# Patient Record
Sex: FEMALE | Race: BLACK OR AFRICAN AMERICAN | NOT HISPANIC OR LATINO
[De-identification: names, ages, dates, MRNs, and addresses within clinical notes are randomized per-mention and may not be internally consistent; named-entity substitution may affect disease eponyms.]

---

## 2018-11-23 ENCOUNTER — APPOINTMENT (OUTPATIENT)
Dept: DERMATOLOGY | Facility: CLINIC | Age: 48
End: 2018-11-23

## 2019-12-26 ENCOUNTER — RESULT REVIEW (OUTPATIENT)
Age: 49
End: 2019-12-26

## 2021-04-20 ENCOUNTER — APPOINTMENT (OUTPATIENT)
Dept: RHEUMATOLOGY | Facility: CLINIC | Age: 51
End: 2021-04-20

## 2021-04-20 VITALS
HEIGHT: 69 IN | OXYGEN SATURATION: 100 % | SYSTOLIC BLOOD PRESSURE: 122 MMHG | TEMPERATURE: 96.3 F | HEART RATE: 90 BPM | WEIGHT: 205.5 LBS | BODY MASS INDEX: 30.44 KG/M2 | RESPIRATION RATE: 16 BRPM | DIASTOLIC BLOOD PRESSURE: 92 MMHG

## 2021-04-20 NOTE — PHYSICAL EXAM
[Sclera] : the sclera and conjunctiva were normal [General Appearance - Alert] : alert [Outer Ear] : the ears and nose were normal in appearance [Examination Of The Oral Cavity] : the lips and gums were normal [Nasal Cavity] : the nasal mucosa and septum were normal [Neck Appearance] : the appearance of the neck was normal [] : no respiratory distress [Auscultation Breath Sounds / Voice Sounds] : lungs were clear to auscultation bilaterally [Exaggerated Use Of Accessory Muscles For Inspiration] : no accessory muscle use [Heart Rate And Rhythm] : heart rate was normal and rhythm regular [Heart Sounds] : normal S1 and S2 [Heart Sounds Gallop] : no gallops [Murmurs] : no murmurs [Heart Sounds Pericardial Friction Rub] : no pericardial rub [Bowel Sounds] : normal bowel sounds [Abdomen Soft] : soft [Abdomen Tenderness] : non-tender [Cervical Lymph Nodes Enlarged Posterior Bilaterally] : posterior cervical [Cervical Lymph Nodes Enlarged Anterior Bilaterally] : anterior cervical [Axillary Lymph Nodes Enlarged Bilaterally] : axillary [No CVA Tenderness] : no ~M costovertebral angle tenderness [No Spinal Tenderness] : no spinal tenderness [Musculoskeletal - Swelling] : no joint swelling seen [FreeTextEntry1] : see hpi [No Focal Deficits] : no focal deficits

## 2021-04-20 NOTE — HISTORY OF PRESENT ILLNESS
[FreeTextEntry1] : 51 year old female diagnosed with SLE more than 10 years ago by Dr Crystal Sagastume; most recently followed by Marsha Lutz.  She has had MIRANDA, DNA, leukopenia, malar rash, photosensitivity, alopecia (patchy)--given clobetasol shampoo in the past, a history of anorexia with a 20lb weight loss in 2017,  and arthritis affecting her knees, ankles, fingers which can be associated with swelling and stiffness for several hours.  She also notes fatigue despite 8-10 hours of sleep.  There is no history of renal, hepatic, serositis, miscarriages, thrombosis, seizures or psychosis.  She has not been hospitalized, has received no steroids or immunosuppressants.  She has been on plaquenil for over 10 years with opthalmology follow-up, but off since December 2020 secondary to insurance issues (she is currently unemployed).  She currently notes malar and other red rashes, alopecia and joint pains in her knees, ankles and fingers.  No fever,  anorexia, weight loss, lymphadenopathy, chills,oral ulcers, dry eyes, chest pain, shortness of breath, cough, anosmia, ageusia, nausea, vomiting, diarrhea, abdominal pain.  \par \par SH: no tob, no ETOH, no recreational drugs\par FH:  non-contributory, no autoimmune disease; 2 brothers, 1 sister, no children\par Surg: Lasik\par Hospitalization: none\par Allergy: PCN--rash\par \par PE: NAD  122/92 205lbs 8ox\par Mild malar erythema, frontal hair loss with patchy areas of decreased hair density on posterior scalp. No oral/nasal ulceration, lymphadenopathy, synovitis (or joint tenderness), periungal erythema, parotid swelling, lacrimal enlargement, nodules.  Lungs: Clear without wheeze, rales, rhonch; Cor: S1S2 without murmur, gallop or rub; Abd: soft, NT, BS+;, Back: no spinal or paraspinal tenderness; Ext: no CCE; Neuro: non-focal\par \par Imp:\par SLE with active cutaneous features and ?MS manifestations (none on current exam).  Will check labs.  Will represcribe plaquenil.  Will request medical records from Dr Michele

## 2021-04-26 LAB
CREAT SPEC-SCNC: 136 MG/DL
CREAT/PROT UR: 0.1 RATIO
PROT UR-MCNC: 9 MG/DL

## 2021-06-21 ENCOUNTER — APPOINTMENT (OUTPATIENT)
Dept: RHEUMATOLOGY | Facility: CLINIC | Age: 51
End: 2021-06-21

## 2021-06-21 VITALS
HEART RATE: 77 BPM | BODY MASS INDEX: 30.57 KG/M2 | DIASTOLIC BLOOD PRESSURE: 84 MMHG | SYSTOLIC BLOOD PRESSURE: 122 MMHG | WEIGHT: 207 LBS | OXYGEN SATURATION: 98 % | TEMPERATURE: 97.4 F | RESPIRATION RATE: 12 BRPM

## 2021-06-22 NOTE — PHYSICAL EXAM
[General Appearance - Alert] : alert [Sclera] : the sclera and conjunctiva were normal [Outer Ear] : the ears and nose were normal in appearance [Examination Of The Oral Cavity] : the lips and gums were normal [Nasal Cavity] : the nasal mucosa and septum were normal [Neck Appearance] : the appearance of the neck was normal [] : no respiratory distress [Exaggerated Use Of Accessory Muscles For Inspiration] : no accessory muscle use [Auscultation Breath Sounds / Voice Sounds] : lungs were clear to auscultation bilaterally [Heart Rate And Rhythm] : heart rate was normal and rhythm regular [Heart Sounds] : normal S1 and S2 [Heart Sounds Gallop] : no gallops [Murmurs] : no murmurs [Heart Sounds Pericardial Friction Rub] : no pericardial rub [Edema] : there was no peripheral edema [Bowel Sounds] : normal bowel sounds [Abdomen Soft] : soft [Abdomen Tenderness] : non-tender [Cervical Lymph Nodes Enlarged Posterior Bilaterally] : posterior cervical [Cervical Lymph Nodes Enlarged Anterior Bilaterally] : anterior cervical [Supraclavicular Lymph Nodes Enlarged Bilaterally] : supraclavicular [No CVA Tenderness] : no ~M costovertebral angle tenderness [No Spinal Tenderness] : no spinal tenderness [Musculoskeletal - Swelling] : no joint swelling seen [Skin Lesions] : no skin lesions [No Focal Deficits] : no focal deficits [FreeTextEntry1] : see hpi

## 2021-06-22 NOTE — HISTORY OF PRESENT ILLNESS
[FreeTextEntry1] : 51 year old female diagnosed with SLE more than 10 years ago by Dr Crystal Sagastume; most recently followed by Marsha Lutz.  She has had MIRADNA, DNA, leukopenia, malar rash, photosensitivity, alopecia (patchy)--given clobetasol shampoo in the past, a history of anorexia with a 20lb weight loss in 2017,  and arthritis affecting her knees, ankles, fingers which can be associated with swelling and stiffness for several hours.  She also notes fatigue despite 8-10 hours of sleep.  There is no history of renal, hepatic, serositis, miscarriages, thrombosis, seizures or psychosis.  She has not been hospitalized, has received no steroids or immunosuppressants.  She has been on plaquenil for over 10 years with opthalmology follow-up, but off since December 2020 secondary to insurance issues (she is currently unemployed).  \par Apr 20, 2021 notes malar and other red rashes, alopecia and joint pains in her knees, ankles and fingers.  No fever,  anorexia, weight loss, lymphadenopathy, chills,oral ulcers, dry eyes, chest pain, shortness of breath, cough, anosmia, ageusia, nausea, vomiting, diarrhea, abdominal pain.  \par \par June 21, 2021\par f/u SLE, she has been on plaquenil since med-May.  She continues to note fatigue (which is better) and alopecia; she has also had facial rashes (but none at the moment--and, the malar erythema seen last visit is not present today).  The joint pains in her fingers and ankles are improved; her knees continue to be painful and the right knee has buckled on several occasions.  She also is concerned that her nails are not growing.  She received the Pfizer vaccine and experienced a sore arm without other adverse effects.  There has been no fever, anorexia, weight loss, headaches, lymphadenopathy, chills, joral ulcers,  dry eyes, chest pain, shortness of breath, cough, anosmia, ageusia, nausea, vomiting, diarrhea, abdominal pain. Of note, records from her Dr [facility] were the same as those initially given by the patient. Her labs from last visit were significant for leukopenia (WBC 1.93), elevated DNA (262), but normal complements and normal renal function.\par \par SH: no tob, no ETOH, no recreational drugs\par FH:  non-contributory, no autoimmune disease; 2 brothers, 1 sister, no children\par Surg: Lasik\par Hospitalization: none\par Allergy: PCN--rash\par \par PE: NAD  122/84 207lbs 8ox\par No malar erythema, frontal hair loss and lupus frizz with patchy areas of decreased hair density on posterior scalp. No oral/nasal ulceration, lymphadenopathy, synovitis (or joint tenderness), periungal erythema, parotid swelling, lacrimal enlargement, nodules.  Lungs: Clear without wheeze, rales, rhonch; Cor: S1S2 without murmur, gallop or rub; Abd: soft, NT, BS+;, Back: no spinal or paraspinal tenderness; Ext: no CCE; Neuro: non-focal\par \par Imp:\par SLE recently restarted on plaquenil with improvement of fatigue and musculoskeletal manifestations.  Will check labs and will see in ~6 weeks.  \par \par Knee pain/buckling--Have discussed that these symptoms are not likely caused by lupus, but are more likely to be degenerative.  Have given exercises to strengthen the hip flexors--she currently is without insurance (it is pendin), but have discussed role of physical therapy.

## 2021-08-02 ENCOUNTER — APPOINTMENT (OUTPATIENT)
Dept: RHEUMATOLOGY | Facility: CLINIC | Age: 51
End: 2021-08-02

## 2021-08-02 VITALS
RESPIRATION RATE: 14 BRPM | OXYGEN SATURATION: 99 % | SYSTOLIC BLOOD PRESSURE: 125 MMHG | TEMPERATURE: 97.8 F | DIASTOLIC BLOOD PRESSURE: 86 MMHG | BODY MASS INDEX: 30.96 KG/M2 | WEIGHT: 209 LBS | HEIGHT: 69 IN | HEART RATE: 89 BPM

## 2021-08-02 RX ORDER — DESONIDE 0.5 MG/ML
0.05 LOTION TOPICAL DAILY
Qty: 1 | Refills: 2 | Status: ACTIVE | COMMUNITY
Start: 2021-08-02 | End: 1900-01-01

## 2021-08-02 NOTE — PHYSICAL EXAM
[General Appearance - Alert] : alert [Sclera] : the sclera and conjunctiva were normal [Outer Ear] : the ears and nose were normal in appearance [Examination Of The Oral Cavity] : the lips and gums were normal [Nasal Cavity] : the nasal mucosa and septum were normal [Neck Appearance] : the appearance of the neck was normal [] : no respiratory distress [Exaggerated Use Of Accessory Muscles For Inspiration] : no accessory muscle use [Auscultation Breath Sounds / Voice Sounds] : lungs were clear to auscultation bilaterally [Heart Rate And Rhythm] : heart rate was normal and rhythm regular [Heart Sounds] : normal S1 and S2 [Heart Sounds Gallop] : no gallops [Murmurs] : no murmurs [Heart Sounds Pericardial Friction Rub] : no pericardial rub [Edema] : there was no peripheral edema [Bowel Sounds] : normal bowel sounds [Abdomen Soft] : soft [Abdomen Tenderness] : non-tender [Cervical Lymph Nodes Enlarged Posterior Bilaterally] : posterior cervical [Supraclavicular Lymph Nodes Enlarged Bilaterally] : supraclavicular [Cervical Lymph Nodes Enlarged Anterior Bilaterally] : anterior cervical [No CVA Tenderness] : no ~M costovertebral angle tenderness [No Spinal Tenderness] : no spinal tenderness [Musculoskeletal - Swelling] : no joint swelling seen [Skin Lesions] : no skin lesions [No Focal Deficits] : no focal deficits [FreeTextEntry1] : see hpi

## 2021-08-02 NOTE — HISTORY OF PRESENT ILLNESS
[FreeTextEntry1] : 51 year old female diagnosed with SLE more than 10 years ago by Dr Crystal Sagastume; most recently followed by Marsha Lutz.  She has had MIRANDA, DNA, leukopenia, malar rash, photosensitivity, alopecia (patchy)--given clobetasol shampoo in the past, a history of anorexia with a 20lb weight loss in 2017,  and arthritis affecting her knees, ankles, fingers which can be associated with swelling and stiffness for several hours.  She also notes fatigue despite 8-10 hours of sleep.  There is no history of renal, hepatic, serositis, miscarriages, thrombosis, seizures or psychosis.  She has not been hospitalized, has received no steroids or immunosuppressants.  She has been on plaquenil for over 10 years with opthalmology follow-up, but off since December 2020 secondary to insurance issues (she is currently unemployed).  \par Apr 20, 2021 notes malar and other red rashes, alopecia and joint pains in her knees, ankles and fingers.  No fever,  anorexia, weight loss, lymphadenopathy, chills,oral ulcers, dry eyes, chest pain, shortness of breath, cough, anosmia, ageusia, nausea, vomiting, diarrhea, abdominal pain.  \par \par June 21, 2021\par f/u SLE, she has been on plaquenil since med-May.  She continues to note fatigue (which is better) and alopecia; she has also had facial rashes (but none at the moment--and, the malar erythema seen last visit is not present today).  The joint pains in her fingers and ankles are improved; her knees continue to be painful and the right knee has buckled on several occasions.  She also is concerned that her nails are not growing.  She received the Pfizer vaccine and experienced a sore arm without other adverse effects.  There has been no fever, anorexia, weight loss, headaches, lymphadenopathy, chills, joral ulcers,  dry eyes, chest pain, shortness of breath, cough, anosmia, ageusia, nausea, vomiting, diarrhea, abdominal pain. Of note, records from her Dr [facility] were the same as those initially given by the patient. Her labs from last visit were significant for leukopenia (WBC 1.93), elevated DNA (262), but normal complements and normal renal function.\par \par August 2, 2021 PtGA 6.0\par f/u SLE, continues with symptoms, but feels that she is ~40% improved--now on plaquenil since mid-May.  These include her joint pains (of her toes and fingers--without swelling) and fatigue.  She does have 30-60 minutes of am stiffness.  She continues to note alopecia and is considering follicle plug implants.  No fever, rash, anorexia, weight loss (she has gained 2 lbs), lymphadenopathy, chills, joint pain/swelling/stiffness, oral ulcers, fatigue, dry eyes, chest pain, shortness of breath, cough, anosmia, ageusia, nausea, vomiting, diarrhea, abdominal pain. She also notes pruritis of her scalp.\par \par SH: no tob, no ETOH, no recreational drugs\par FH:  non-contributory, no autoimmune disease; 2 brothers, 1 sister, no children\par Surg: Lasik\par Hospitalization: none\par Allergy: PCN--rash\par \par PE: NAD  122/84 207lbs 8ox\par No malar erythema, frontal hair loss and lupus frizz with patchy areas of decreased hair density on posterior scalp. No oral/nasal ulceration, lymphadenopathy, synovitis (or joint tenderness), periungal erythema, parotid swelling, lacrimal enlargement, nodules.  Lungs: Clear without wheeze, rales, rhonch; Cor: S1S2 without murmur, gallop or rub; Abd: soft, NT, BS+;, Back: no spinal or paraspinal tenderness; Ext: no CCE; Neuro: non-focal\par \par Imp:\par SLE on plaquenil x 21/2 months. with improvement of fatigue and musculoskeletal manifestations.  Will check labs and will see in ~6 weeks.  Have suggested that she see derm (?steroid scalp injections)--will give desonide for scalp.\par \par Knee pain/buckling--She continues with home exercises for strengthening of hip flexors--she continues without insurance (it is pending), will refer to PT when she obtains insurance.

## 2021-10-25 ENCOUNTER — APPOINTMENT (OUTPATIENT)
Dept: RHEUMATOLOGY | Facility: CLINIC | Age: 51
End: 2021-10-25

## 2022-02-28 ENCOUNTER — APPOINTMENT (OUTPATIENT)
Dept: RHEUMATOLOGY | Facility: CLINIC | Age: 52
End: 2022-02-28

## 2022-02-28 VITALS
HEART RATE: 74 BPM | TEMPERATURE: 97.8 F | OXYGEN SATURATION: 98 % | BODY MASS INDEX: 31.01 KG/M2 | WEIGHT: 210 LBS | DIASTOLIC BLOOD PRESSURE: 77 MMHG | SYSTOLIC BLOOD PRESSURE: 124 MMHG | RESPIRATION RATE: 14 BRPM

## 2022-02-28 DIAGNOSIS — M32.9 SYSTEMIC LUPUS ERYTHEMATOSUS, UNSPECIFIED: ICD-10-CM

## 2022-02-28 NOTE — HISTORY OF PRESENT ILLNESS
[FreeTextEntry1] : 52 year old female diagnosed with SLE prior to 2011 by Dr Crystal Sagastume; most recently followed by Marsha Lutz.  She has had MIRANDA, DNA, leukopenia, malar rash, photosensitivity, alopecia (patchy)--given clobetasol shampoo in the past, a history of anorexia with a 20lb weight loss in 2017,  and arthritis affecting her knees, ankles, fingers which can be associated with swelling and stiffness for several hours.  She also notes fatigue despite 8-10 hours of sleep.  There is no history of renal, hepatic, serositis, miscarriages, thrombosis, seizures or psychosis.  She has not been hospitalized, has received no steroids or immunosuppressants.  She has been on plaquenil for over 10 years with opthalmology follow-up, but off since December 2020 secondary to insurance issues (she is currently unemployed).  \par Apr 20, 2021 notes malar and other red rashes, alopecia and joint pains in her knees, ankles and fingers.  No fever,  anorexia, weight loss, lymphadenopathy, chills,oral ulcers, dry eyes, chest pain, shortness of breath, cough, anosmia, ageusia, nausea, vomiting, diarrhea, abdominal pain.  \par \par June 21, 2021\par f/u SLE, she has been on plaquenil since med-May.  She continues to note fatigue (which is better) and alopecia; she has also had facial rashes (but none at the moment--and, the malar erythema seen last visit is not present today).  The joint pains in her fingers and ankles are improved; her knees continue to be painful and the right knee has buckled on several occasions.  She also is concerned that her nails are not growing.  She received the Pfizer vaccine and experienced a sore arm without other adverse effects.  There has been no fever, anorexia, weight loss, headaches, lymphadenopathy, chills, joral ulcers,  dry eyes, chest pain, shortness of breath, cough, anosmia, ageusia, nausea, vomiting, diarrhea, abdominal pain. Of note, records from her Dr [facility] were the same as those initially given by the patient. Her labs from last visit were significant for leukopenia (WBC 1.93), elevated DNA (262), but normal complements and normal renal function.\par \par August 2, 2021 PtGA 6.0\par f/u SLE, continues with symptoms, but feels that she is ~40% improved--now on plaquenil since mid-May. These include her joint pains (of her toes and fingers--without swelling) and fatigue. She does have 30-60 minutes of am stiffness. She continues to note alopecia and is considering follicle plug implants. No fever, rash, anorexia, weight loss (she has gained 2 lbs), lymphadenopathy, chills, joint pain/swelling/stiffness, oral ulcers, fatigue, dry eyes, chest pain, shortness of breath, cough, anosmia, ageusia, nausea, vomiting, diarrhea, abdominal pain. She also notes pruritis of her scalp.\par \par Knee pain/buckling--Have discussed that these symptoms are not likely caused by lupus, but are more likely to be degenerative.  Have given exercises to strengthen the hip flexors--she currently is without insurance (it is pendin), but have discussed role of physical therapy.\par \par Feb 28, 2022 PtGA 2.5\par f/u SLE--in general feels better (her plaquenil was increased to 600mg TIW in August.  Notes alopecia (although hair is braided and states that large amounts are lost when she undoes the garrett and washes), still with stiffness, ~20min but no pain or swelling in joints.  Additionally, no fever, anorexia, weight loss, lymphadenopathy, chills, joint pain/swelling/stiffness, oral ulcers, fatigue, dry eyes, chest pain, shortness of breath, cough, nausea, vomiting, diarrhea, abdominal pain. She received the COVID booster on Dec 30.\par \par SH: no tob, no ETOH, no recreational drugs\par FH:  non-contributory, no autoimmune disease; 2 brothers, 1 sister, no children\par Surg: Lasik\par Hospitalization: none\par Allergy: PCN--rash\par \par PE: NAD  122/84 207lbs 8ox\par No malar erythema, frontal hair loss and lupus frizz with patchy areas of decreased hair density on posterior scalp. No oral/nasal ulceration, lymphadenopathy, synovitis (or joint tenderness), periungal erythema, parotid swelling, lacrimal enlargement, nodules.  Lungs: Clear without wheeze, rales, rhonch; Cor: S1S2 without murmur, gallop or rub; Abd: soft, NT, BS+;, Back: no spinal or paraspinal tenderness; Ext: no CCE; Neuro: non-focal\par \par Imp:\par SLE on plaquenil with improvement of mucocutaneous, fatigue and musculoskeletal manifestations.  Will check labs and will see in ~3 months.  Have discussed ACEi study; consent given and she will consider..  \par

## 2022-03-11 ENCOUNTER — APPOINTMENT (OUTPATIENT)
Dept: RHEUMATOLOGY | Facility: CLINIC | Age: 52
End: 2022-03-11

## 2022-03-11 VITALS
HEART RATE: 75 BPM | TEMPERATURE: 97.8 F | SYSTOLIC BLOOD PRESSURE: 128 MMHG | DIASTOLIC BLOOD PRESSURE: 91 MMHG | OXYGEN SATURATION: 100 % | BODY MASS INDEX: 30.96 KG/M2 | RESPIRATION RATE: 16 BRPM | HEIGHT: 69 IN | WEIGHT: 209 LBS

## 2022-03-11 RX ORDER — CLOBETASOL PROPIONATE 0.05 G/100ML
0.05 SHAMPOO TOPICAL DAILY
Qty: 1 | Refills: 1 | Status: ACTIVE | COMMUNITY
Start: 2021-08-16 | End: 1900-01-01

## 2022-03-11 NOTE — HISTORY OF PRESENT ILLNESS
[FreeTextEntry1] : 52 year old female diagnosed with SLE prior to 2011 by Dr Crystal Sagastume; most recently followed by Marsha Lutz.  She has had MIRANAD, DNA, leukopenia, malar rash, photosensitivity, alopecia (patchy)--given clobetasol shampoo in the past, a history of anorexia with a 20lb weight loss in 2017,  and arthritis affecting her knees, ankles, fingers which can be associated with swelling and stiffness for several hours.  She also notes fatigue despite 8-10 hours of sleep.  There is no history of renal, hepatic, serositis, miscarriages, thrombosis, seizures or psychosis.  She has not been hospitalized, has received no steroids or immunosuppressants.  She has been on plaquenil for over 10 years with opthalmology follow-up, but off since December 2020 secondary to insurance issues (she is currently unemployed).  \par Apr 20, 2021 notes malar and other red rashes, alopecia and joint pains in her knees, ankles and fingers.  No fever,  anorexia, weight loss, lymphadenopathy, chills,oral ulcers, dry eyes, chest pain, shortness of breath, cough, anosmia, ageusia, nausea, vomiting, diarrhea, abdominal pain.  \par \par June 21, 2021\par f/u SLE, she has been on plaquenil since med-May.  She continues to note fatigue (which is better) and alopecia; she has also had facial rashes (but none at the moment--and, the malar erythema seen last visit is not present today).  The joint pains in her fingers and ankles are improved; her knees continue to be painful and the right knee has buckled on several occasions.  She also is concerned that her nails are not growing.  She received the Pfizer vaccine and experienced a sore arm without other adverse effects.  There has been no fever, anorexia, weight loss, headaches, lymphadenopathy, chills, joral ulcers,  dry eyes, chest pain, shortness of breath, cough, anosmia, ageusia, nausea, vomiting, diarrhea, abdominal pain. Of note, records from her Dr [facility] were the same as those initially given by the patient. Her labs from last visit were significant for leukopenia (WBC 1.93), elevated DNA (262), but normal complements and normal renal function.\par \par August 2, 2021 PtGA 6.0\par f/u SLE, continues with symptoms, but feels that she is ~40% improved--now on plaquenil since mid-May. These include her joint pains (of her toes and fingers--without swelling) and fatigue. She does have 30-60 minutes of am stiffness. She continues to note alopecia and is considering follicle plug implants. No fever, rash, anorexia, weight loss (she has gained 2 lbs), lymphadenopathy, chills, joint pain/swelling/stiffness, oral ulcers, fatigue, dry eyes, chest pain, shortness of breath, cough, anosmia, ageusia, nausea, vomiting, diarrhea, abdominal pain. She also notes pruritis of her scalp.\par \par Knee pain/buckling--Have discussed that these symptoms are not likely caused by lupus, but are more likely to be degenerative.  Have given exercises to strengthen the hip flexors--she currently is without insurance (it is pendin), but have discussed role of physical therapy.\par \par Feb 28, 2022 PtGA 2.5\par f/u SLE--in general feels better (her plaquenil was increased to 600mg TIW in August.  Notes alopecia (although hair is braided and states that large amounts are lost when she undoes the garrett and washes), still with stiffness, ~20min but no pain or swelling in joints.  Additionally, no fever, anorexia, weight loss, lymphadenopathy, chills, joint pain/swelling/stiffness, oral ulcers, fatigue, dry eyes, chest pain, shortness of breath, cough, nausea, vomiting, diarrhea, abdominal pain. She received the COVID booster on Dec 30.\par \par Mar 11, 2022   ACEi Screening Visit\par f/u SLE, in general she continues to feel well on Plaquenil 600mg TIW since August and 400mg the remainder of the days.  Notes no change in symptoms since her last visit on 2/28/22, i.e. continued alopecia, continued joint pains and stiffness in her hands (MCP's and PIP's) as well as her knees.  Her knee symptoms are not inflammatory; her hand symptoms are consistent with an inflammatory process due to their location and associated am stiffness.  No rash, ulcers, lymphadenopathy, fever, anorexia, weight loss, chills, oral ulcers, fatigue, dry eyes, chest pain, shortness of breath, cough, headache, nausea, vomiting, diarrhea, abdominal pain. \par \par SH: no tob, no ETOH, no recreational drugs\par FH:  non-contributory, no autoimmune disease; 2 brothers, 1 sister, no children\par Surg: Retinal surgery\par Hospitalization: none\par Allergy: PCN--rash\par Meds:  Plaquenil 600mg TIW, 400mg 4x/week since Aug 2021\par \par PE: NAD  128/91 209lbs 8ox\par Frontal hair loss and lupus frizz with patchy areas of decreased hair density on posterior scalp. No malar erythema,oral/nasal ulceration, lymphadenopathy, synovitis (or joint tenderness), periungal erythema, parotid swelling, lacrimal enlargement, nodules.  Lungs: Clear without wheeze, rales, rhonch; Cor: S1S2 without murmur, gallop or rub; Abd: soft, NT, BS+; Back: no spinal or paraspinal tenderness; Ext: no CCE; Neuro: non-focal\par \par Imp:\par SLE (MIRANDA, DNA, leukopenia, malar rash, photosensitivity, alopecia)on plaquenil with improvement of mucocutaneous, fatigue and musculoskeletal manifestations. Informed consent obtained for the ACEi study.  Screening procedures performed.  \par

## 2022-03-11 NOTE — PHYSICAL EXAM
[General Appearance - Alert] : alert [Sclera] : the sclera and conjunctiva were normal [Outer Ear] : the ears and nose were normal in appearance [Examination Of The Oral Cavity] : the lips and gums were normal [Nasal Cavity] : the nasal mucosa and septum were normal [Neck Appearance] : the appearance of the neck was normal [Exaggerated Use Of Accessory Muscles For Inspiration] : no accessory muscle use [Auscultation Breath Sounds / Voice Sounds] : lungs were clear to auscultation bilaterally [Heart Rate And Rhythm] : heart rate was normal and rhythm regular [Heart Sounds] : normal S1 and S2 [Heart Sounds Gallop] : no gallops [Murmurs] : no murmurs [Heart Sounds Pericardial Friction Rub] : no pericardial rub [Edema] : there was no peripheral edema [Bowel Sounds] : normal bowel sounds [Abdomen Soft] : soft [Abdomen Tenderness] : non-tender [Cervical Lymph Nodes Enlarged Posterior Bilaterally] : posterior cervical [Cervical Lymph Nodes Enlarged Anterior Bilaterally] : anterior cervical [Supraclavicular Lymph Nodes Enlarged Bilaterally] : supraclavicular [No CVA Tenderness] : no ~M costovertebral angle tenderness [No Spinal Tenderness] : no spinal tenderness [Musculoskeletal - Swelling] : no joint swelling seen [] : no rash [Skin Lesions] : no skin lesions [No Focal Deficits] : no focal deficits [FreeTextEntry1] : see hpi

## 2022-03-22 ENCOUNTER — RESULT REVIEW (OUTPATIENT)
Age: 52
End: 2022-03-22

## 2022-03-22 ENCOUNTER — OUTPATIENT (OUTPATIENT)
Dept: OUTPATIENT SERVICES | Facility: HOSPITAL | Age: 52
LOS: 1 days | End: 2022-03-22
Payer: SUBSIDIZED

## 2022-03-22 ENCOUNTER — APPOINTMENT (OUTPATIENT)
Dept: RHEUMATOLOGY | Facility: CLINIC | Age: 52
End: 2022-03-22

## 2022-03-22 VITALS
OXYGEN SATURATION: 100 % | SYSTOLIC BLOOD PRESSURE: 127 MMHG | BODY MASS INDEX: 30.36 KG/M2 | HEIGHT: 69 IN | TEMPERATURE: 97.8 F | RESPIRATION RATE: 16 BRPM | HEART RATE: 78 BPM | DIASTOLIC BLOOD PRESSURE: 87 MMHG | WEIGHT: 205 LBS

## 2022-03-22 DIAGNOSIS — Z00.6 ENCOUNTER FOR EXAMINATION FOR NORMAL COMPARISON AND CONTROL IN CLINICAL RESEARCH PROGRAM: ICD-10-CM

## 2022-03-22 DIAGNOSIS — M32.10 SYSTEMIC LUPUS ERYTHEMATOSUS, ORGAN OR SYSTEM INVOLVEMENT UNSPECIFIED: ICD-10-CM

## 2022-03-22 PROCEDURE — A9552: CPT

## 2022-03-22 PROCEDURE — 78608 BRAIN IMAGING (PET): CPT

## 2022-03-22 PROCEDURE — 82962 GLUCOSE BLOOD TEST: CPT

## 2022-03-22 NOTE — HISTORY OF PRESENT ILLNESS
[FreeTextEntry1] : Patient arrived for PET scan for visit 0.1 of the ACEi study.  No new medications, no adverse events since last visit.

## 2022-03-23 LAB — GLUCOSE BLDC GLUCOMTR-MCNC: 72 MG/DL — SIGNIFICANT CHANGE UP (ref 70–99)

## 2022-04-04 ENCOUNTER — APPOINTMENT (OUTPATIENT)
Dept: RHEUMATOLOGY | Facility: CLINIC | Age: 52
End: 2022-04-04

## 2022-04-04 VITALS
HEART RATE: 105 BPM | OXYGEN SATURATION: 98 % | WEIGHT: 203.5 LBS | DIASTOLIC BLOOD PRESSURE: 80 MMHG | RESPIRATION RATE: 16 BRPM | BODY MASS INDEX: 30.14 KG/M2 | TEMPERATURE: 98 F | SYSTOLIC BLOOD PRESSURE: 119 MMHG | HEIGHT: 69 IN

## 2022-04-04 RX ORDER — CYCLOBENZAPRINE HYDROCHLORIDE 5 MG/1
5 TABLET, FILM COATED ORAL
Qty: 30 | Refills: 2 | Status: ACTIVE | COMMUNITY
Start: 2022-04-04 | End: 1900-01-01

## 2022-04-04 NOTE — HISTORY OF PRESENT ILLNESS
[FreeTextEntry1] : 52 year old female w SLE diagnosed 2011, participating in ACEi study, returning for visit 1.0.\par SLE prior to 2011 by Dr Crystal Sagastume; most recently followed by Marsha Lutz. She has had MIRANDA, DNA, leukopenia, malar rash, photosensitivity, alopecia (patchy)--given clobetasol shampoo in the past, a history of anorexia with a 20lb weight loss in 2017, and arthritis affecting her knees, ankles, fingers which can be associated with swelling and stiffness for several hours. She also notes fatigue despite 8-10 hours of sleep. There is no history of renal, hepatic, serositis, miscarriages, thrombosis, seizures or psychosis. She has not been hospitalized, has received no steroids or immunosuppressants. She has been on plaquenil for over 10 years with opthalmology follow-up, but off since December 2020 secondary to insurance issues (she is currently unemployed). \par Apr 20, 2021 notes malar and other red rashes, alopecia and joint pains in her knees, ankles and fingers. No fever, anorexia, weight loss, lymphadenopathy, chills,oral ulcers, dry eyes, chest pain, shortness of breath, cough, anosmia, ageusia, nausea, vomiting, diarrhea, abdominal pain. \par \par June 21, 2021\par f/u SLE, she has been on plaquenil since med-May. She continues to note fatigue (which is better) and alopecia; she has also had facial rashes (but none at the moment--and, the malar erythema seen last visit is not present today). The joint pains in her fingers and ankles are improved; her knees continue to be painful and the right knee has buckled on several occasions. She also is concerned that her nails are not growing. She received the Pfizer vaccine and experienced a sore arm without other adverse effects. There has been no fever, anorexia, weight loss, headaches, lymphadenopathy, chills, joral ulcers, dry eyes, chest pain, shortness of breath, cough, anosmia, ageusia, nausea, vomiting, diarrhea, abdominal pain. Of note, records from her [facility] were the same as those initially given by the patient. Her labs from last visit were significant for leukopenia (WBC 1.93), elevated DNA (262), but normal complements and normal renal function.\par \par August 2, 2021 PtGA 6.0\par f/u SLE, continues with symptoms, but feels that she is ~40% improved--now on plaquenil since mid-May. These include her joint pains (of her toes and fingers--without swelling) and fatigue. She does have 30-60 minutes of am stiffness. She continues to note alopecia and is considering follicle plug implants. No fever, rash, anorexia, weight loss (she has gained 2 lbs), lymphadenopathy, chills, joint pain/swelling/stiffness, oral ulcers, fatigue, dry eyes, chest pain, shortness of breath, cough, anosmia, ageusia, nausea, vomiting, diarrhea, abdominal pain. She also notes pruritis of her scalp.\par \par Knee pain/buckling--Have discussed that these symptoms are not likely caused by lupus, but are more likely to be degenerative. Have given exercises to strengthen the hip flexors--she currently is without insurance (it is pendin), but have discussed role of physical therapy.\par \par Feb 28, 2022 PtGA 2.5\par f/u SLE--in general feels better (her plaquenil was increased to 600mg TIW in August. Notes alopecia (although hair is braided and states that large amounts are lost when she undoes the garrett and washes), still with stiffness, ~20min but no pain or swelling in joints. Additionally, no fever, anorexia, weight loss, lymphadenopathy, chills, joint pain/swelling/stiffness, oral ulcers, fatigue, dry eyes, chest pain, shortness of breath, cough, nausea, vomiting, diarrhea, abdominal pain. She received the COVID booster on Dec 30.\par \par Mar 11, 2022 ACEi Screening Visit\par f/u SLE, in general she continues to feel well on Plaquenil 600mg TIW since August and 400mg the remainder of the days. Notes no change in symptoms since her last visit on 2/28/22, i.e. continued alopecia, continued joint pains and stiffness in her hands (MCP's and PIP's) as well as her knees. Her knee symptoms are not inflammatory; her hand symptoms are consistent with an inflammatory process due to their location and associated am stiffness. No rash, ulcers, lymphadenopathy, fever, anorexia, weight loss, chills, oral ulcers, fatigue, dry eyes, chest pain, shortness of breath, cough, headache, nausea, vomiting, diarrhea, abdominal pain. \par \par Apr 4, 2022  ACEi Visit 1\par f/u SLE returning for visit 1; she continues with alopecia with thinning of her hairline and hair observed on her pillow.  She notes stiffness in her hips and knees lasting 2-3 hours and improved with motion; no swelling or warmth--she does note that the knees "click"; no hand pain or stiffness.  Additionally with no fever, rash, anorexia, weight loss, lymphadenopathy, chills, oral ulcers, fatigue, dry eyes, chest pain, shortness of breath, cough, anosmia, ageusia, nausea, vomiting, diarrhea, abdominal pain. \par \par SH: no tob, no ETOH, no recreational drugs\par FH: non-contributory, no autoimmune disease; 2 brothers, 1 sister, no children\par Surg: Retinal surgery\par Hospitalization: none\par Allergy: PCN--rash\par Meds: Plaquenil 600mg TIW, 400mg 4x/week since Aug 2021\par \par PE: /80 203lbs 8ox\par Frontal hair loss and lupus frizz with patchy areas of decreased hair density on posterior scalp. No malar erythema,oral/nasal ulceration, lymphadenopathy, synovitis (or joint tenderness) with full range of motion of hips; no periungal erythema, parotid swelling, lacrimal enlargement, nodules. Lungs: Clear without wheeze, rales, rhonch; Cor: S1S2 without murmur, gallop or rub; Abd: soft, NT, BS+; Back: no spinal or paraspinal tenderness; Ext: no CCE; Neuro: non-focal\par \par Imp:\par SLE (MIRANDA, DNA, leukopenia, malar rash, photosensitivity, alopecia)on plaquenil with continued alopecia; stiffness not clearly from SLE given it's location (and her bmi) and previous response to muscle relaxants.  Procedures for ACEi (including labs) performed.\par \par \par

## 2022-04-13 ENCOUNTER — OUTPATIENT (OUTPATIENT)
Dept: OUTPATIENT SERVICES | Facility: HOSPITAL | Age: 52
LOS: 1 days | End: 2022-04-13
Payer: SUBSIDIZED

## 2022-04-13 ENCOUNTER — RESULT REVIEW (OUTPATIENT)
Age: 52
End: 2022-04-13

## 2022-04-13 ENCOUNTER — APPOINTMENT (OUTPATIENT)
Dept: RHEUMATOLOGY | Facility: CLINIC | Age: 52
End: 2022-04-13

## 2022-04-13 DIAGNOSIS — M32.10 SYSTEMIC LUPUS ERYTHEMATOSUS, ORGAN OR SYSTEM INVOLVEMENT UNSPECIFIED: ICD-10-CM

## 2022-04-13 DIAGNOSIS — Z00.6 ENCOUNTER FOR EXAMINATION FOR NORMAL COMPARISON AND CONTROL IN CLINICAL RESEARCH PROGRAM: ICD-10-CM

## 2022-04-13 PROCEDURE — 78608 BRAIN IMAGING (PET): CPT

## 2022-04-14 ENCOUNTER — RESULT REVIEW (OUTPATIENT)
Age: 52
End: 2022-04-14

## 2022-04-14 ENCOUNTER — APPOINTMENT (OUTPATIENT)
Dept: MRI IMAGING | Facility: HOSPITAL | Age: 52
End: 2022-04-14

## 2022-04-14 ENCOUNTER — OUTPATIENT (OUTPATIENT)
Dept: OUTPATIENT SERVICES | Facility: HOSPITAL | Age: 52
LOS: 1 days | End: 2022-04-14
Payer: SUBSIDIZED

## 2022-04-14 DIAGNOSIS — M32.10 SYSTEMIC LUPUS ERYTHEMATOSUS, ORGAN OR SYSTEM INVOLVEMENT UNSPECIFIED: ICD-10-CM

## 2022-04-14 DIAGNOSIS — Z00.00 ENCOUNTER FOR GENERAL ADULT MEDICAL EXAMINATION WITHOUT ABNORMAL FINDINGS: ICD-10-CM

## 2022-04-14 DIAGNOSIS — Z00.6 ENCOUNTER FOR EXAMINATION FOR NORMAL COMPARISON AND CONTROL IN CLINICAL RESEARCH PROGRAM: ICD-10-CM

## 2022-04-14 PROCEDURE — 70551 MRI BRAIN STEM W/O DYE: CPT

## 2022-04-14 PROCEDURE — 70551 MRI BRAIN STEM W/O DYE: CPT | Mod: 26

## 2022-04-18 ENCOUNTER — APPOINTMENT (OUTPATIENT)
Dept: RHEUMATOLOGY | Facility: CLINIC | Age: 52
End: 2022-04-18

## 2022-04-18 NOTE — HISTORY OF PRESENT ILLNESS
[FreeTextEntry1] : 52 year old female w SLE diagnosed 2011, participating in ACEi study, returning for visit 1.1.\par \par Apr 18, 2022 \par Now returning for visit 1.1; she has completed baseline neurocognitive testing and baseline imaging and she returns for initiation of study medication.  She reports no adverse events since her last evaluation.  There have been no changes in her con meds.  \par \par \par \par \par \par Imp:\par SLE (MIRANDA, DNA, leukopenia, malar rash, photosensitivity, alopecia)on plaquenil with continued alopecia; stiffness not clearly from SLE given it's location (and her bmi) and previous response to muscle relaxants.\par \par IP given to patients; she will initiate treatment tomorrow.\par \par \par

## 2022-05-06 ENCOUNTER — APPOINTMENT (OUTPATIENT)
Dept: RHEUMATOLOGY | Facility: CLINIC | Age: 52
End: 2022-05-06

## 2022-05-06 VITALS
RESPIRATION RATE: 14 BRPM | WEIGHT: 199.8 LBS | SYSTOLIC BLOOD PRESSURE: 116 MMHG | DIASTOLIC BLOOD PRESSURE: 74 MMHG | BODY MASS INDEX: 29.51 KG/M2 | HEART RATE: 80 BPM | TEMPERATURE: 99 F

## 2022-05-06 NOTE — HISTORY OF PRESENT ILLNESS
[FreeTextEntry1] : 52 year old female w SLE diagnosed 2011, participating in ACEi study, returning for visit 2.0\par \par F/U SLE, received study medication and has tolerated without difficulty.  No cough, lightheadedness, faintess, palpatations.  Continues with fatigue and alopecia as well as noting pain/stiffness in her hands (right >left) in the morning as well as pain/stiffness in her right>left knee.  No fever, rash, anorexia, weight loss, lymphadenopathy, chills, oral ulcers, chest pain, shortness of breath, anosmia, ageusia, nausea, vomiting, diarrhea, abdominal pain.  No adverse events, no change in con meds.  \par \par No orthostatic hypotension\par \par \par \par \par \par Imp:\par SLE (MIRANDA, DNA, leukopenia, malar rash, photosensitivity, alopecia)on plaquenil with continued alopecia; stiffness not clearly from SLE given it's location (and her bmi) and previous response to muscle relaxants.\par \par IP dose increased; med given to patients; she will initiate treatment tomorrow.\par \par \par

## 2022-05-18 ENCOUNTER — APPOINTMENT (OUTPATIENT)
Dept: RHEUMATOLOGY | Facility: CLINIC | Age: 52
End: 2022-05-18

## 2022-05-18 VITALS
BODY MASS INDEX: 29.2 KG/M2 | WEIGHT: 197.13 LBS | HEIGHT: 69 IN | RESPIRATION RATE: 15 BRPM | HEART RATE: 88 BPM | SYSTOLIC BLOOD PRESSURE: 115 MMHG | OXYGEN SATURATION: 100 % | DIASTOLIC BLOOD PRESSURE: 82 MMHG | TEMPERATURE: 97.8 F

## 2022-05-23 NOTE — ASSESSMENT
[FreeTextEntry1] : SLE (MIRANDA, DNA, leukopenia, malar rash, photosensitivity, alopecia)on plaquenil with continued alopecia.  Procedures for visit 2.1 conducted.\par \par IP dose increased; med given to patients; she will initiate treatment tomorrow.\par

## 2022-05-23 NOTE — HISTORY OF PRESENT ILLNESS
[FreeTextEntry1] : 52 year old female w SLE diagnosed 2011, participating in ACEi study, returning for visit 2.1\par \par F/U SLE, received study medication and has tolerated without difficulty.  No cough, lightheadedness, faintness, palpatations.  Continues with fatigue and alopecia as well as noting pain/stiffness in her hands (right >left) in the morning as well as pain/stiffness in her right>left knee.  No fever, rash, anorexia, weight loss, lymphadenopathy, chills, oral ulcers, chest pain, shortness of breath, anosmia, ageusia, nausea, vomiting, diarrhea, abdominal pain.  No adverse events, no change in con meds.  \par \par No orthostatic hypotension noted\par \par \par \par Imp:\par SLE (MIRANDA, DNA, leukopenia, malar rash, photosensitivity, alopecia)on plaquenil with continued alopecia; stiffness not clearly from SLE given it's location (and her bmi) and previous response to muscle relaxants.\par \par IP dose increased; med given to patients; she will initiate treatment tomorrow.\par \par \par

## 2022-06-01 ENCOUNTER — APPOINTMENT (OUTPATIENT)
Dept: RHEUMATOLOGY | Facility: CLINIC | Age: 52
End: 2022-06-01

## 2022-06-01 VITALS
HEART RATE: 84 BPM | OXYGEN SATURATION: 98 % | TEMPERATURE: 97.8 F | RESPIRATION RATE: 14 BRPM | SYSTOLIC BLOOD PRESSURE: 109 MMHG | WEIGHT: 190.38 LBS | HEIGHT: 69 IN | BODY MASS INDEX: 28.2 KG/M2 | DIASTOLIC BLOOD PRESSURE: 75 MMHG

## 2022-06-01 NOTE — ASSESSMENT
[FreeTextEntry1] : SLE (MIRANDA, DNA, leukopenia, malar rash, photosensitivity, alopecia)on plaquenil with continued alopecia.  Procedures for visit 3 conducted.\par \par IP dose to remain unchanged\par

## 2022-06-01 NOTE — HISTORY OF PRESENT ILLNESS
[FreeTextEntry1] : 52 year old female w SLE diagnosed 2011, participating in ACEi study, returning for visit 3\par \par F/U SLE, received study medication and continues to tolerate without difficulty.  No cough, lightheadedness, faintness, palpitations.  Continues to note fatigue and alopecia as well as noting pain/stiffness in her hands with morning stiffness as well as pain/stiffness in bilateral knees.  No fever, rash, anorexia, weight loss, lymphadenopathy, chills, oral ulcers, chest pain, shortness of breath, anosmia, ageusia, nausea, vomiting, diarrhea, abdominal pain.  No adverse events, no change in con meds.  \par \par No orthostatic hypotension noted\par \par \par \par Imp:\par SLE (MIRANDA, DNA, leukopenia, malar rash, photosensitivity, alopecia)on plaquenil with continued alopecia; stiffness not clearly from SLE given it's location (and her bmi) and previous response to muscle relaxants.\par \par IP dose to remain stable.\par \par \par

## 2022-06-27 ENCOUNTER — APPOINTMENT (OUTPATIENT)
Dept: RHEUMATOLOGY | Facility: CLINIC | Age: 52
End: 2022-06-27

## 2022-06-27 VITALS
RESPIRATION RATE: 15 BRPM | HEART RATE: 78 BPM | TEMPERATURE: 97.5 F | DIASTOLIC BLOOD PRESSURE: 75 MMHG | SYSTOLIC BLOOD PRESSURE: 118 MMHG | WEIGHT: 190 LBS | BODY MASS INDEX: 28.14 KG/M2 | HEIGHT: 69 IN

## 2022-06-27 VITALS — OXYGEN SATURATION: 99 %

## 2022-06-27 NOTE — HISTORY OF PRESENT ILLNESS
RP received fax from [facility].   Sent to medical records.     Urea Nitrogen (BUN) 21  Creatinine 0.73  EGFR 77.8    Unique Hitchcock RN     [FreeTextEntry1] : 52 year old female w SLE diagnosed 2011, participating in ACEi study, returning for visit 4\par \par F/U SLE, continues to tolerate study medication @20mgwithout difficulty.  No cough, lightheadedness, faintness, palpitations.  She has noticed that her alopecia although present, has improved, but she does continue to note fatigue and pain/stiffness in her hands and bilateral knees.  No fever, rash, anorexia, weight loss, lymphadenopathy, chills, oral ulcers, chest pain, shortness of breath, anosmia, ageusia, nausea, vomiting, diarrhea, abdominal pain.  No adverse events, no change in con meds.  \par \par \par \par \par \par Imp:\par SLE (MIRANDA, DNA, leukopenia, malar rash, photosensitivity, alopecia)on plaquenil with improved alopecia but continued stiffness in her hands and knees.  The latter is not clearly from SLE given it's location (and her bmi) and previous response to muscle relaxants, however, will try an emperic trial of colchicine for her hand pain/stiffness\par \par IP dose to remain stable.\par \par \par

## 2022-06-27 NOTE — PHYSICAL EXAM
[General Appearance - Alert] : alert [Sclera] : the sclera and conjunctiva were normal [Outer Ear] : the ears and nose were normal in appearance [Examination Of The Oral Cavity] : the lips and gums were normal [Nasal Cavity] : the nasal mucosa and septum were normal [Neck Appearance] : the appearance of the neck was normal [Exaggerated Use Of Accessory Muscles For Inspiration] : no accessory muscle use [Auscultation Breath Sounds / Voice Sounds] : lungs were clear to auscultation bilaterally [Heart Rate And Rhythm] : heart rate was normal and rhythm regular [Heart Sounds] : normal S1 and S2 [Heart Sounds Gallop] : no gallops [Murmurs] : no murmurs [Heart Sounds Pericardial Friction Rub] : no pericardial rub [Edema] : there was no peripheral edema [Bowel Sounds] : normal bowel sounds [Abdomen Soft] : soft [Abdomen Tenderness] : non-tender [Cervical Lymph Nodes Enlarged Posterior Bilaterally] : posterior cervical [Cervical Lymph Nodes Enlarged Anterior Bilaterally] : anterior cervical [Supraclavicular Lymph Nodes Enlarged Bilaterally] : supraclavicular [No CVA Tenderness] : no ~M costovertebral angle tenderness [No Spinal Tenderness] : no spinal tenderness [Musculoskeletal - Swelling] : no joint swelling seen [] : no rash [Skin Lesions] : no skin lesions [No Focal Deficits] : no focal deficits [FreeTextEntry1] : milld alopecia along hairline; sparce hair on posterior scalp

## 2022-06-27 NOTE — ASSESSMENT
[FreeTextEntry1] : SLE (MIRANDA, DNA, leukopenia, malar rash, photosensitivity, alopecia)on plaquenil with continued alopecia.  Procedures for visit 4 conducted.  Will give trial of colchicine, 0.6mg/d\par \par IP dose stable\par

## 2022-07-25 ENCOUNTER — APPOINTMENT (OUTPATIENT)
Dept: RHEUMATOLOGY | Facility: CLINIC | Age: 52
End: 2022-07-25

## 2022-07-25 VITALS
WEIGHT: 183 LBS | SYSTOLIC BLOOD PRESSURE: 95 MMHG | BODY MASS INDEX: 27.02 KG/M2 | TEMPERATURE: 97.8 F | OXYGEN SATURATION: 100 % | HEART RATE: 88 BPM | RESPIRATION RATE: 14 BRPM | DIASTOLIC BLOOD PRESSURE: 65 MMHG

## 2022-08-01 NOTE — ASSESSMENT
[FreeTextEntry1] : SLE (MIRANDA, DNA, leukopenia, malar rash, photosensitivity, alopecia)on plaquenil with continued alopecia.  Procedures for visit 5.0 conducted.\par \par \par

## 2022-08-01 NOTE — HISTORY OF PRESENT ILLNESS
[FreeTextEntry1] : 52 year old female w SLE diagnosed 2011, participating in ACEi study, returning for visit 5\par \par F/U SLE, continues to tolerate study medication @20mgwithout difficulty.  No cough, lightheadedness, faintness, palpitations.  Continues to state that her alopecia although present, has improved.  There is still fatigue and pain/stiffness in her hands and bilateral knees.  No fever, rash, anorexia, weight loss, lymphadenopathy, chills, oral ulcers, chest pain, shortness of breath, anosmia, ageusia, nausea, vomiting, diarrhea, abdominal pain.  No adverse events, no change in con meds.  \par \par \par \par Imp:\par SLE (MIRANDA, DNA, leukopenia, malar rash, photosensitivity, alopecia)on plaquenil with improved alopecia but continued stiffness in her hands and knees.  The latter is not clearly from SLE given it's location (and her bmi) and previous response to muscle relaxants, she has not initiated the emperic trial of colchicine for her hand pain/stiffness--have advised that this may be helpful to her condition.\par \par IP dose to remain stable.\par \par \par

## 2022-08-22 ENCOUNTER — APPOINTMENT (OUTPATIENT)
Dept: RHEUMATOLOGY | Facility: CLINIC | Age: 52
End: 2022-08-22

## 2022-08-22 VITALS
WEIGHT: 185.25 LBS | TEMPERATURE: 97.8 F | SYSTOLIC BLOOD PRESSURE: 103 MMHG | DIASTOLIC BLOOD PRESSURE: 76 MMHG | HEART RATE: 110 BPM | OXYGEN SATURATION: 99 % | RESPIRATION RATE: 17 BRPM | BODY MASS INDEX: 27.36 KG/M2

## 2022-08-22 NOTE — HISTORY OF PRESENT ILLNESS
[FreeTextEntry1] : 52 year old female w SLE diagnosed 2011, participating in ACEi study, returning for visit 6\par \par F/U SLE, continues to tolerate study medication @20mgwithout difficulty. She noted loose stools (~2 weeks ago) and thought it might be secondary to the medication so stopped it for 2 days (?8/8-8/9).  Continues without cough, lightheadedness, faintness, palpitations.  No regrowth of hair.  Continues with fatigue and pain/stiffness in her hands and bilateral knees.  The pain is worse today then previously.  She never initiated colchicine.  No fever, rash, anorexia, weight loss, lymphadenopathy, chills, oral ulcers, chest pain, shortness of breath, anosmia, ageusia, nausea, vomiting, diarrhea, abdominal pain.  No change in con meds.  \par \par \par \par Imp:\par SLE (MIRANDA, DNA, leukopenia, malar rash, photosensitivity, alopecia)on plaquenil with improved alopecia but continued stiffness in her hands and knees.  The latter is has not been clearly from SLE given it's location (and her bmi) and previous response to muscle relaxants, she has not initiated the emperic trial of colchicine for her hand pain/stiffness--have advised again that this may be helpful to her condition.\par \par IP dose to remain stable.\par \par \par

## 2022-08-27 ENCOUNTER — NON-APPOINTMENT (OUTPATIENT)
Age: 52
End: 2022-08-27

## 2022-09-19 ENCOUNTER — APPOINTMENT (OUTPATIENT)
Dept: RHEUMATOLOGY | Facility: CLINIC | Age: 52
End: 2022-09-19

## 2022-09-19 VITALS
TEMPERATURE: 98.7 F | SYSTOLIC BLOOD PRESSURE: 116 MMHG | BODY MASS INDEX: 27.63 KG/M2 | DIASTOLIC BLOOD PRESSURE: 84 MMHG | RESPIRATION RATE: 16 BRPM | WEIGHT: 187.13 LBS | HEART RATE: 91 BPM | OXYGEN SATURATION: 99 %

## 2022-09-19 NOTE — PHYSICAL EXAM
[General Appearance - Alert] : alert [Sclera] : the sclera and conjunctiva were normal [Outer Ear] : the ears and nose were normal in appearance [Examination Of The Oral Cavity] : the lips and gums were normal [Nasal Cavity] : the nasal mucosa and septum were normal [Neck Appearance] : the appearance of the neck was normal [Exaggerated Use Of Accessory Muscles For Inspiration] : no accessory muscle use [Auscultation Breath Sounds / Voice Sounds] : lungs were clear to auscultation bilaterally [Heart Rate And Rhythm] : heart rate was normal and rhythm regular [Heart Sounds] : normal S1 and S2 [Heart Sounds Gallop] : no gallops [Murmurs] : no murmurs [Heart Sounds Pericardial Friction Rub] : no pericardial rub [Edema] : there was no peripheral edema [Bowel Sounds] : normal bowel sounds [Abdomen Soft] : soft [Abdomen Tenderness] : non-tender [Cervical Lymph Nodes Enlarged Posterior Bilaterally] : posterior cervical [Cervical Lymph Nodes Enlarged Anterior Bilaterally] : anterior cervical [Supraclavicular Lymph Nodes Enlarged Bilaterally] : supraclavicular [No CVA Tenderness] : no ~M costovertebral angle tenderness [No Spinal Tenderness] : no spinal tenderness [Musculoskeletal - Swelling] : no joint swelling seen [] : no rash [Skin Lesions] : no skin lesions [No Focal Deficits] : no focal deficits [FreeTextEntry1] : milld alopecia along hairline; sparce hair on posterior scalp--no change

## 2022-09-19 NOTE — HISTORY OF PRESENT ILLNESS
[FreeTextEntry1] : 52 year old female w SLE diagnosed 2011, participating in ACEi study, returning for visit 7\par \par F/U SLE, continues to tolerate study medication @20mgwithout difficulty. She continues to observe "loose" stools (~noticed first ~6 weeks ago), no blood/mucous. Continues without cough, lightheadedness, faintness, palpitations.  No regrowth of hair.  Continues with fatigue and pain/stiffness in her hands and bilateral knees.  The pain is worse today then previously.  She never initiated colchicine.  No fever, rash, anorexia, weight loss, lymphadenopathy, chills, oral ulcers, chest pain, shortness of breath, anosmia, ageusia, nausea, vomiting, diarrhea, abdominal pain.  No change in con meds--she has not initiated colchicine.  \par \par \par \par Imp:\par SLE (MIRANDA, DNA, leukopenia, malar rash, photosensitivity, alopecia)on plaquenil with improved alopecia but continued stiffness in her hands and knees.  The latter continues to be of unclear etiology given location (and her bmi) and previous response to muscle relaxants, she has still not initiated the emperic trial of colchicine for her hand pain/stiffness--have advised again that this may be helpful to her condition.\par \par IP dose to remain stable.\par \par \par

## 2022-09-19 NOTE — ASSESSMENT
[FreeTextEntry1] : SLE (MIRANDA, DNA, leukopenia, malar rash, photosensitivity, alopecia)on plaquenil with continued alopecia.  Procedures for visit7.0 conducted.\par \par She states that she will initiate colchicine..\par

## 2022-10-24 ENCOUNTER — APPOINTMENT (OUTPATIENT)
Dept: RHEUMATOLOGY | Facility: CLINIC | Age: 52
End: 2022-10-24

## 2022-10-24 VITALS
OXYGEN SATURATION: 99 % | HEART RATE: 76 BPM | TEMPERATURE: 97.7 F | WEIGHT: 186.25 LBS | BODY MASS INDEX: 27.5 KG/M2 | SYSTOLIC BLOOD PRESSURE: 116 MMHG | DIASTOLIC BLOOD PRESSURE: 75 MMHG | RESPIRATION RATE: 15 BRPM

## 2022-10-24 VITALS — DIASTOLIC BLOOD PRESSURE: 75 MMHG | SYSTOLIC BLOOD PRESSURE: 116 MMHG | WEIGHT: 186.25 LBS | BODY MASS INDEX: 27.5 KG/M2

## 2022-10-24 NOTE — HISTORY OF PRESENT ILLNESS
[FreeTextEntry1] : 52 year old female w SLE diagnosed 2011, participating in ACEi study, returning for visit 8\par \par F/U SLE, continues to tolerate study medication @20mgwithout difficulty. No longer observing "loose" stools which she had attributed to study med.  Continues without cough, lightheadedness, faintness, palpitations.  No regrowth of hair, but less loss.  Continues with fatigue and pain/stiffness in her hands and bilateral knees--up to 2 hours.  She has not initiated colchicine (she is not sure where she placed it, but does not want it represcribed as she does not wish to pay again for a medication.  No fever, rash, anorexia, weight loss (her weight is stable), lymphadenopathy, chills, oral ulcers, chest pain, shortness of breath, anosmia, ageusia, nausea, vomiting, diarrhea, abdominal pain.  No change in con meds--she has not initiated colchicine.  \par \par \par \par Imp:\par SLE (MIRANDA, DNA, leukopenia, malar rash, photosensitivity, alopecia)on plaquenil with improved alopecia but continued stiffness in her hands and knees.  The latter continues to be of unclear etiology given location (and her bmi) and previous response to muscle relaxants, she has not initiated the emperic trial of colchicine, but will look for the medication and will "try" it. \par \par IP dose to remain stable.\par \par \par

## 2022-11-21 ENCOUNTER — APPOINTMENT (OUTPATIENT)
Dept: RHEUMATOLOGY | Facility: CLINIC | Age: 52
End: 2022-11-21

## 2022-12-19 ENCOUNTER — APPOINTMENT (OUTPATIENT)
Dept: RHEUMATOLOGY | Facility: CLINIC | Age: 52
End: 2022-12-19

## 2022-12-19 VITALS
SYSTOLIC BLOOD PRESSURE: 114 MMHG | WEIGHT: 181 LBS | OXYGEN SATURATION: 100 % | HEART RATE: 88 BPM | TEMPERATURE: 97.8 F | DIASTOLIC BLOOD PRESSURE: 78 MMHG | RESPIRATION RATE: 14 BRPM | BODY MASS INDEX: 26.73 KG/M2

## 2022-12-19 RX ORDER — COLCHICINE 0.6 MG/1
0.6 TABLET ORAL
Qty: 30 | Refills: 1 | Status: ACTIVE | COMMUNITY
Start: 2022-06-27 | End: 1900-01-01

## 2023-01-09 ENCOUNTER — APPOINTMENT (OUTPATIENT)
Dept: RHEUMATOLOGY | Facility: CLINIC | Age: 53
End: 2023-01-09

## 2023-01-09 NOTE — HISTORY OF PRESENT ILLNESS
[FreeTextEntry1] : 52 year old female w SLE diagnosed 2011, participating in ACEi study, being "seen" virtually for visit 11.  However, despite multiple attempts, not able to communicate on virtual platform.  She is unable to come for a F2F visit and is therefore sending pictures of her hands and face/head/neck.\par \par F/U SLE, continues to tolerate study medication @20mgwithout difficulty-- not lightheaded nor dizzy (and no reoccurrance of "loose" stools which she had originally attributed to study med.  She continues with joint stiffness, but no pain nor swelling.  She has not initiated colchicine (and did not pick-up medication at her pharmacy last month).  Continues with "hair loss", no regrowth but not on her pillow.  Continues with fatigue   No fever, rash, anorexia, weight loss (her weight is stable), lymphadenopathy, chills, oral ulcers, chest pain, shortness of breath, anosmia, ageusia, nausea, vomiting, diarrhea, abdominal pain.  No change in con meds--\par \par PE-- limited but no obvious rash, alopecia, synovitis/joint swelling.  She denied tenderness upon squeezing of her joints.\par \par \par Imp:\par SLE (MIRANDA, DNA, leukopenia, malar rash, photosensitivity, alopecia)on plaquenil with improved alopecia but continued stiffness in her hands and knees.  The latter continues to be of unclear etiology given location (and her bmi) and previous response to muscle relaxants, she has not initiated the emperic trial of colchicine, states she will go to pharmacy to pick it up.   \par \par IP dose to remain stable.\par \par \par

## 2023-02-06 ENCOUNTER — APPOINTMENT (OUTPATIENT)
Dept: RHEUMATOLOGY | Facility: CLINIC | Age: 53
End: 2023-02-06

## 2023-02-08 ENCOUNTER — APPOINTMENT (OUTPATIENT)
Dept: RHEUMATOLOGY | Facility: CLINIC | Age: 53
End: 2023-02-08

## 2023-02-13 ENCOUNTER — APPOINTMENT (OUTPATIENT)
Dept: RHEUMATOLOGY | Facility: CLINIC | Age: 53
End: 2023-02-13

## 2023-02-13 VITALS
HEART RATE: 77 BPM | DIASTOLIC BLOOD PRESSURE: 80 MMHG | TEMPERATURE: 97.8 F | RESPIRATION RATE: 14 BRPM | BODY MASS INDEX: 27.69 KG/M2 | SYSTOLIC BLOOD PRESSURE: 117 MMHG | OXYGEN SATURATION: 99 % | WEIGHT: 187.5 LBS

## 2023-02-13 NOTE — HISTORY OF PRESENT ILLNESS
[FreeTextEntry1] : 53 year old female w SLE diagnosed 2011, participating in ACEi study, being seen for visit 12.  She continues to tolerate study medication @20mgwithout difficulty-- no cough, nor lightheaded or dizzy (and no reoccurrance of "loose" stools which she had originally attributed to study med.  Joint stiffness without pain or swelling.continues but she has not initiated colchicine and states that she completely forgot about it.   Continues with "hair loss" she tried not wearing a wig for several days, but felt her hair was falling out.  Stll with fatigue but no fever, rash, anorexia, weight loss (her weight has stablized), lymphadenopathy, chills, oral ulcers, chest pain, shortness of breath, anosmia, ageusia, nausea, vomiting, diarrhea, abdominal pain.  No change in con meds--\par \par .\par \par \par Imp:\par SLE (MIRANDA, DNA, leukopenia, malar rash, photosensitivity, alopecia)on plaquenil with improved alopecia but continued stiffness in her hands and knees.  The latter continues to be of unclear etiology given location (and her bmi) and previous response to muscle relaxants, she has not initiated the emperic trial of colchicine, have discussed trying to cognitively link "stiffness" to "colchicine"\par IP dose to remain stable.\par \par \par

## 2023-02-13 NOTE — PHYSICAL EXAM
[General Appearance - Alert] : alert [Sclera] : the sclera and conjunctiva were normal [Outer Ear] : the ears and nose were normal in appearance [Examination Of The Oral Cavity] : the lips and gums were normal [Nasal Cavity] : the nasal mucosa and septum were normal [Neck Appearance] : the appearance of the neck was normal [Exaggerated Use Of Accessory Muscles For Inspiration] : no accessory muscle use [Auscultation Breath Sounds / Voice Sounds] : lungs were clear to auscultation bilaterally [Heart Rate And Rhythm] : heart rate was normal and rhythm regular [Heart Sounds] : normal S1 and S2 [Heart Sounds Gallop] : no gallops [Murmurs] : no murmurs [Heart Sounds Pericardial Friction Rub] : no pericardial rub [Edema] : there was no peripheral edema [Abdomen Soft] : soft [Bowel Sounds] : normal bowel sounds [Abdomen Tenderness] : non-tender [Cervical Lymph Nodes Enlarged Posterior Bilaterally] : posterior cervical [Cervical Lymph Nodes Enlarged Anterior Bilaterally] : anterior cervical [Supraclavicular Lymph Nodes Enlarged Bilaterally] : supraclavicular [No CVA Tenderness] : no ~M costovertebral angle tenderness [No Spinal Tenderness] : no spinal tenderness [Musculoskeletal - Swelling] : no joint swelling seen [] : no rash [Skin Lesions] : no skin lesions [No Focal Deficits] : no focal deficits [FreeTextEntry1] : milld alopecia along hairline; sparce hair on posterior scalp--no change

## 2023-03-13 ENCOUNTER — APPOINTMENT (OUTPATIENT)
Dept: RHEUMATOLOGY | Facility: CLINIC | Age: 53
End: 2023-03-13

## 2023-03-20 NOTE — HISTORY OF PRESENT ILLNESS
[FreeTextEntry1] : 53 year old female w SLE diagnosed 2011, participating in ACEi study, being seen for virtual visit.  She continues to tolerate study medication @20mgwithout difficulty-- no cough, nor lightheaded or dizzy (and no reoccurrance of "loose" stools which she had originally attributed to study med.  Joint stiffness without pain or swelling.continues but she has not initiated colchicine and states that she completely forgot about it.   Continues with "hair loss" and fatigue.  However, she has no fever, rash, anorexia, weight loss (her weight has stablized), lymphadenopathy, chills, oral ulcers, chest pain, shortness of breath, anosmia, ageusia, nausea, vomiting, diarrhea, abdominal pain.  No change in con meds-- \par No adverse events\par \par .\par \par \par Imp:\par SLE (MIRANDA, DNA, leukopenia, malar rash, photosensitivity, alopecia)on plaquenil with improved alopecia but continued stiffness in her hands and knees.  The latter continues to be of unclear etiology given location (and her bmi) and previous response to muscle relaxants, she has not initiated the emperic trial of colchicine, have discussed trying to cognitively link "stiffness" to "colchicine", will contact her in a few days with a reminder.\par IP dose to remain stable.\par \par \par

## 2023-03-20 NOTE — ASSESSMENT
[FreeTextEntry1] : \par Imp:\par SLE (MIRANDA, DNA, leukopenia, malar rash, photosensitivity, alopecia)on plaquenil with improved alopecia but continued stiffness in her hands and knees.  The latter continues to be of unclear etiology given location (and her bmi) and previous response to muscle relaxants, she has not initiated the emperic trial of colchicine, have discussed trying to cognitively link "stiffness" to "colchicine", will contact her in a few days with a reminder.\par IP dose to remain stable.

## 2023-03-20 NOTE — PHYSICAL EXAM
[General Appearance - Alert] : alert [Sclera] : the sclera and conjunctiva were normal [Outer Ear] : the ears and nose were normal in appearance [Examination Of The Oral Cavity] : the lips and gums were normal [Nasal Cavity] : the nasal mucosa and septum were normal [Neck Appearance] : the appearance of the neck was normal [Exaggerated Use Of Accessory Muscles For Inspiration] : no accessory muscle use [Edema] : there was no peripheral edema [Abdomen Soft] : soft [Bowel Sounds] : normal bowel sounds [Abdomen Tenderness] : non-tender [Cervical Lymph Nodes Enlarged Posterior Bilaterally] : posterior cervical [Cervical Lymph Nodes Enlarged Anterior Bilaterally] : anterior cervical [No CVA Tenderness] : no ~M costovertebral angle tenderness [No Spinal Tenderness] : no spinal tenderness [Musculoskeletal - Swelling] : no joint swelling seen [] : no rash [Skin Lesions] : no skin lesions [No Focal Deficits] : no focal deficits [FreeTextEntry1] : milld alopecia along hairline; Unable to evaluate previously seen: sparce hair on posterior scalp--no change

## 2023-04-10 ENCOUNTER — APPOINTMENT (OUTPATIENT)
Dept: RHEUMATOLOGY | Facility: CLINIC | Age: 53
End: 2023-04-10

## 2023-04-10 VITALS
OXYGEN SATURATION: 99 % | SYSTOLIC BLOOD PRESSURE: 103 MMHG | RESPIRATION RATE: 14 BRPM | BODY MASS INDEX: 27.23 KG/M2 | DIASTOLIC BLOOD PRESSURE: 66 MMHG | WEIGHT: 184.38 LBS | TEMPERATURE: 97.8 F | HEART RATE: 88 BPM

## 2023-04-10 NOTE — ASSESSMENT
[FreeTextEntry1] : Imp:\par SLE (MIRANDA, DNA, leukopenia, malar rash, photosensitivity, alopecia)on plaquenil with improved alopecia but continued stiffness in her hands and knees.  Fatigue continues.  Have discussed emperically raising colchicine to bid for 1 week; she will "pay attention" to whether there is or isn't a clinical response.   t\par IP dose to remain stable.

## 2023-04-10 NOTE — PHYSICAL EXAM
[General Appearance - Alert] : alert [Sclera] : the sclera and conjunctiva were normal [Outer Ear] : the ears and nose were normal in appearance [Examination Of The Oral Cavity] : the lips and gums were normal [Nasal Cavity] : the nasal mucosa and septum were normal [Neck Appearance] : the appearance of the neck was normal [Edema] : there was no peripheral edema [Exaggerated Use Of Accessory Muscles For Inspiration] : no accessory muscle use [Bowel Sounds] : normal bowel sounds [Abdomen Soft] : soft [Abdomen Tenderness] : non-tender [Cervical Lymph Nodes Enlarged Posterior Bilaterally] : posterior cervical [Cervical Lymph Nodes Enlarged Anterior Bilaterally] : anterior cervical [No CVA Tenderness] : no ~M costovertebral angle tenderness [No Spinal Tenderness] : no spinal tenderness [Musculoskeletal - Swelling] : no joint swelling seen [] : no rash [Skin Lesions] : no skin lesions [No Focal Deficits] : no focal deficits [FreeTextEntry1] : milld alopecia along hairline; Unable to evaluate previously seen: sparce hair on posterior scalp--no change

## 2023-04-10 NOTE — HISTORY OF PRESENT ILLNESS
All other review of systems negative, except as noted in HPI
[FreeTextEntry1] : 53 year old female w SLE diagnosed 2011, participating in ACEi study, being seen for visit 14 visit.  She continues to tolerate study medication @20mgwithout difficulty-- no cough, nor lightheaded or dizzy (and no reoccurrance of "loose" stools which she had originally attributed to study med.  She initiated colchicine after last visit but is not sure if her Joint stiffness has changed; she continues without joint pain or swellingt.   Continues with fatigue; states that hair is not growing.  However, she has no fever, rash, anorexia, weight loss (her weight has stablized), lymphadenopathy, chills, oral ulcers, chest pain, shortness of breath, anosmia, ageusia, nausea, vomiting, diarrhea, abdominal pain.  \par No adverse events\par \par .\par \par \par Imp:\par SLE (MIRANDA, DNA, leukopenia, malar rash, photosensitivity, alopecia)on plaquenil with improved alopecia but continued stiffness in her hands and knees.  Fatigue continues.  Have discussed emperically raising colchicine to bid for 1 week; she will "pay attention" to whether there is or isn't a clinical response.   t\par IP dose to remain stable.\par \par \par

## 2023-05-02 ENCOUNTER — RESULT REVIEW (OUTPATIENT)
Age: 53
End: 2023-05-02

## 2023-05-02 ENCOUNTER — OUTPATIENT (OUTPATIENT)
Dept: OUTPATIENT SERVICES | Facility: HOSPITAL | Age: 53
LOS: 1 days | End: 2023-05-02
Payer: SUBSIDIZED

## 2023-05-02 ENCOUNTER — APPOINTMENT (OUTPATIENT)
Dept: MRI IMAGING | Facility: HOSPITAL | Age: 53
End: 2023-05-02

## 2023-05-02 VITALS
OXYGEN SATURATION: 97 % | HEART RATE: 66 BPM | BODY MASS INDEX: 27.62 KG/M2 | SYSTOLIC BLOOD PRESSURE: 123 MMHG | DIASTOLIC BLOOD PRESSURE: 86 MMHG | RESPIRATION RATE: 16 BRPM | TEMPERATURE: 97.5 F | WEIGHT: 187 LBS

## 2023-05-02 DIAGNOSIS — Z00.6 ENCOUNTER FOR EXAMINATION FOR NORMAL COMPARISON AND CONTROL IN CLINICAL RESEARCH PROGRAM: ICD-10-CM

## 2023-05-02 DIAGNOSIS — M32.10 SYSTEMIC LUPUS ERYTHEMATOSUS, ORGAN OR SYSTEM INVOLVEMENT UNSPECIFIED: ICD-10-CM

## 2023-05-02 PROCEDURE — 70551 MRI BRAIN STEM W/O DYE: CPT | Mod: 26

## 2023-05-02 PROCEDURE — A9552: CPT

## 2023-05-02 PROCEDURE — 82962 GLUCOSE BLOOD TEST: CPT

## 2023-05-02 PROCEDURE — 78608 BRAIN IMAGING (PET): CPT

## 2023-05-02 PROCEDURE — 70551 MRI BRAIN STEM W/O DYE: CPT

## 2023-05-03 LAB — GLUCOSE BLDC GLUCOMTR-MCNC: 80 MG/DL — SIGNIFICANT CHANGE UP (ref 70–99)

## 2023-05-15 ENCOUNTER — APPOINTMENT (OUTPATIENT)
Dept: RHEUMATOLOGY | Facility: CLINIC | Age: 53
End: 2023-05-15

## 2023-05-15 VITALS
BODY MASS INDEX: 27.02 KG/M2 | HEART RATE: 97 BPM | TEMPERATURE: 97.8 F | WEIGHT: 183 LBS | RESPIRATION RATE: 16 BRPM | SYSTOLIC BLOOD PRESSURE: 121 MMHG | OXYGEN SATURATION: 98 % | DIASTOLIC BLOOD PRESSURE: 89 MMHG

## 2023-05-15 NOTE — ASSESSMENT
[FreeTextEntry1] : Imp:\par SLE (MIRANDA, DNA, leukopenia, malar rash, photosensitivity, alopecia)on plaquenil with improved alopecia but continued fatigue and stiffness in her hands and knees.  She will start colchicine (and seems enthusiastic about doing so). Will see (off-study) in 4 weeks.

## 2023-05-15 NOTE — HISTORY OF PRESENT ILLNESS
[FreeTextEntry1] : 53 year old female w SLE diagnosed 2011, participating in ACEi study, being seen for her final study visit 15 visit.  She continued to tolerate study medication @20mgwithout difficulty-- without the (more common) adverse effects that have occasionally been associated with the medication, no cough, nor lightheaded or dizzy; additionally she has had no reoccurrence of "loose" stools which she originally had and attributed to study med.  She now states that she did not initiate her colchicine in March but had initiated pantaprazole (she hadn't realized this before today when she found her colchicine bottle).  There has been no change in her symptoms, i.e. still with joint stiffness but no pain or swelling.   Continues with fatigue; no alopecia but her hair is not growing.  No fever, rash, anorexia, weight loss (her weight has stablized), lymphadenopathy, chills, oral ulcers, chest pain, shortness of breath, anosmia, ageusia, nausea, vomiting, diarrhea, abdominal pain.  \par No adverse events\par \par .\par \par \par Imp:\par SLE (MIRANDA, DNA, leukopenia, malar rash, photosensitivity, alopecia)on plaquenil with improved alopecia but continued fatigue and stiffness in her hands and knees.  She will start colchicine (and seems enthusiastic about doing so). Will see (off-study) in 4 weeks.\par \par \par

## 2023-05-15 NOTE — PHYSICAL EXAM
[General Appearance - Alert] : alert [Sclera] : the sclera and conjunctiva were normal [Outer Ear] : the ears and nose were normal in appearance [Examination Of The Oral Cavity] : the lips and gums were normal [Nasal Cavity] : the nasal mucosa and septum were normal [Neck Appearance] : the appearance of the neck was normal [Exaggerated Use Of Accessory Muscles For Inspiration] : no accessory muscle use [Edema] : there was no peripheral edema [Bowel Sounds] : normal bowel sounds [Abdomen Soft] : soft [Abdomen Tenderness] : non-tender [Cervical Lymph Nodes Enlarged Posterior Bilaterally] : posterior cervical [Cervical Lymph Nodes Enlarged Anterior Bilaterally] : anterior cervical [Supraclavicular Lymph Nodes Enlarged Bilaterally] : supraclavicular [No CVA Tenderness] : no ~M costovertebral angle tenderness [No Spinal Tenderness] : no spinal tenderness [Musculoskeletal - Swelling] : no joint swelling seen [] : no rash [Skin Lesions] : no skin lesions [No Focal Deficits] : no focal deficits [FreeTextEntry1] : milld alopecia along hairline; sparce hair on posterior scalp--no change

## 2023-06-12 ENCOUNTER — APPOINTMENT (OUTPATIENT)
Dept: RHEUMATOLOGY | Facility: CLINIC | Age: 53
End: 2023-06-12

## 2023-06-12 VITALS
DIASTOLIC BLOOD PRESSURE: 69 MMHG | RESPIRATION RATE: 15 BRPM | WEIGHT: 188.13 LBS | OXYGEN SATURATION: 100 % | BODY MASS INDEX: 27.78 KG/M2 | SYSTOLIC BLOOD PRESSURE: 105 MMHG | TEMPERATURE: 97.7 F | HEART RATE: 92 BPM

## 2023-06-12 NOTE — HISTORY OF PRESENT ILLNESS
[FreeTextEntry1] : 53 year old female w SLE diagnosed 2011, recently finished the ACEi study. She has had positive MIRANDA, DNA, leukopenia, malar rash, photosensitivity, alopecia (patchy)--(given clobetasol shampoo in the past), a history of anorexia with a 20lb weight loss in 2017, and arthritis affecting her knees, ankles, fingers which can be associated with swelling and stiffness for several hours. She also notes fatigue despite 8-10 hours of sleep. There is no history of renal, hepatic, serositis, miscarriages, thrombosis, seizures or psychosis. She has not been hospitalized, has received no steroids or immunosuppressants. She had been on plaquenil with opthalmology follow-up, off December 2020 secondary to insurance issues (she is currently unemployed) and then reinitiated in April 2021.  \par Since her last visit, she has been doing well; she initiated colchicine following her last visit and has had a reduction in her morning stiffness (from 3-4 hours to ~1 hour).  States that her hair is not growing; fatigue continues.  Otherwise about the same, no fever, rash, anorexia, weight loss, lymphadenopathy, chills, joint pain/swelling, oral ulcers, dry eyes, chest pain, shortness of breath, cough, anosmia, ageusia, nausea, vomiting, diarrhea, abdominal pain. \par \par \par .\par \par \par \par \par

## 2023-06-12 NOTE — ASSESSMENT
[FreeTextEntry1] : Imp:\par SLE (MIRANDA, DNA, leukopenia, malar rash, arthritis, photosensitivity, alopecia)on plaquenil with improved stiffness on colchicine.  Will continue plaquenil; will check labs.  She will see if taking colchicine at night (rather than mid-day, i.e. ?increased serum level) helps her am stiffness (the drug level may be higher if she takes it prior to sleep).  If there is no change, would recommend increasing the dose to bid.\par \par

## 2023-06-12 NOTE — PHYSICAL EXAM
[General Appearance - Alert] : alert [Sclera] : the sclera and conjunctiva were normal [Outer Ear] : the ears and nose were normal in appearance [Examination Of The Oral Cavity] : the lips and gums were normal [Nasal Cavity] : the nasal mucosa and septum were normal [Neck Appearance] : the appearance of the neck was normal [Exaggerated Use Of Accessory Muscles For Inspiration] : no accessory muscle use [Heart Sounds] : normal S1 and S2 [Heart Sounds Gallop] : no gallops [Murmurs] : no murmurs [Edema] : there was no peripheral edema [Bowel Sounds] : normal bowel sounds [Abdomen Soft] : soft [Abdomen Tenderness] : non-tender [Cervical Lymph Nodes Enlarged Posterior Bilaterally] : posterior cervical [Supraclavicular Lymph Nodes Enlarged Bilaterally] : supraclavicular [Cervical Lymph Nodes Enlarged Anterior Bilaterally] : anterior cervical [No CVA Tenderness] : no ~M costovertebral angle tenderness [No Spinal Tenderness] : no spinal tenderness [Musculoskeletal - Swelling] : no joint swelling seen [] : no rash [Skin Lesions] : no skin lesions [No Focal Deficits] : no focal deficits [FreeTextEntry1] : milld alopecia along hairline; sparce hair on posterior scalp--no change

## 2023-09-13 RX ORDER — HYDROXYCHLOROQUINE SULFATE 200 MG/1
200 TABLET, FILM COATED ORAL DAILY
Qty: 270 | Refills: 0 | Status: ACTIVE | COMMUNITY
Start: 2021-05-05 | End: 1900-01-01

## 2024-08-09 ENCOUNTER — EMERGENCY (EMERGENCY)
Facility: HOSPITAL | Age: 54
LOS: 1 days | Discharge: HOME | End: 2024-08-09
Attending: EMERGENCY MEDICINE | Admitting: EMERGENCY MEDICINE
Payer: MEDICAID

## 2024-08-09 VITALS
RESPIRATION RATE: 14 BRPM | SYSTOLIC BLOOD PRESSURE: 122 MMHG | WEIGHT: 184.97 LBS | OXYGEN SATURATION: 99 % | DIASTOLIC BLOOD PRESSURE: 91 MMHG | HEART RATE: 107 BPM | HEIGHT: 69 IN | TEMPERATURE: 98 F

## 2024-08-09 VITALS
SYSTOLIC BLOOD PRESSURE: 120 MMHG | DIASTOLIC BLOOD PRESSURE: 87 MMHG | OXYGEN SATURATION: 100 % | HEART RATE: 90 BPM | RESPIRATION RATE: 17 BRPM | TEMPERATURE: 98 F

## 2024-08-09 LAB
ALBUMIN SERPL ELPH-MCNC: 4.2 G/DL — SIGNIFICANT CHANGE UP (ref 3.3–5)
ALP SERPL-CCNC: 93 U/L — SIGNIFICANT CHANGE UP (ref 40–120)
ALT FLD-CCNC: 23 U/L — SIGNIFICANT CHANGE UP (ref 4–33)
ANION GAP SERPL CALC-SCNC: 14 MMOL/L — SIGNIFICANT CHANGE UP (ref 7–14)
ANISOCYTOSIS BLD QL: SLIGHT — SIGNIFICANT CHANGE UP
APTT BLD: 33.4 SEC — SIGNIFICANT CHANGE UP (ref 24.5–35.6)
AST SERPL-CCNC: 25 U/L — SIGNIFICANT CHANGE UP (ref 4–32)
BASE EXCESS BLDV CALC-SCNC: -7.1 MMOL/L — LOW (ref -2–3)
BASOPHILS # BLD AUTO: 0.02 K/UL — SIGNIFICANT CHANGE UP (ref 0–0.2)
BASOPHILS NFR BLD AUTO: 0.9 % — SIGNIFICANT CHANGE UP (ref 0–2)
BILIRUB SERPL-MCNC: <0.2 MG/DL — SIGNIFICANT CHANGE UP (ref 0.2–1.2)
BLD GP AB SCN SERPL QL: NEGATIVE — SIGNIFICANT CHANGE UP
BUN SERPL-MCNC: 30 MG/DL — HIGH (ref 7–23)
CA-I SERPL-SCNC: 1.12 MMOL/L — LOW (ref 1.15–1.33)
CALCIUM SERPL-MCNC: 9.8 MG/DL — SIGNIFICANT CHANGE UP (ref 8.4–10.5)
CHLORIDE BLDV-SCNC: 113 MMOL/L — HIGH (ref 96–108)
CHLORIDE SERPL-SCNC: 105 MMOL/L — SIGNIFICANT CHANGE UP (ref 98–107)
CO2 BLDV-SCNC: 18.8 MMOL/L — LOW (ref 22–26)
CO2 SERPL-SCNC: 22 MMOL/L — SIGNIFICANT CHANGE UP (ref 22–31)
CREAT SERPL-MCNC: 0.99 MG/DL — SIGNIFICANT CHANGE UP (ref 0.5–1.3)
EGFR: 69 ML/MIN/1.73M2 — SIGNIFICANT CHANGE UP
EOSINOPHIL # BLD AUTO: 0.05 K/UL — SIGNIFICANT CHANGE UP (ref 0–0.5)
EOSINOPHIL NFR BLD AUTO: 1.9 % — SIGNIFICANT CHANGE UP (ref 0–6)
GAS PNL BLDV: 138 MMOL/L — SIGNIFICANT CHANGE UP (ref 136–145)
GAS PNL BLDV: SIGNIFICANT CHANGE UP
GAS PNL BLDV: SIGNIFICANT CHANGE UP
GLUCOSE BLDV-MCNC: 84 MG/DL — SIGNIFICANT CHANGE UP (ref 70–99)
GLUCOSE SERPL-MCNC: 107 MG/DL — HIGH (ref 70–99)
HCG SERPL-ACNC: <1 MIU/ML — SIGNIFICANT CHANGE UP
HCO3 BLDV-SCNC: 18 MMOL/L — LOW (ref 22–29)
HCT VFR BLD CALC: 35.9 % — SIGNIFICANT CHANGE UP (ref 34.5–45)
HCT VFR BLDA CALC: 33 % — LOW (ref 34.5–46.5)
HGB BLD CALC-MCNC: 10.9 G/DL — LOW (ref 11.7–16.1)
HGB BLD-MCNC: 12.3 G/DL — SIGNIFICANT CHANGE UP (ref 11.5–15.5)
IANC: 1.27 K/UL — LOW (ref 1.8–7.4)
INR BLD: 0.98 RATIO — SIGNIFICANT CHANGE UP (ref 0.85–1.18)
LACTATE BLDV-MCNC: 1 MMOL/L — SIGNIFICANT CHANGE UP (ref 0.5–2)
LACTATE SERPL-SCNC: 3 MMOL/L — HIGH (ref 0.5–2)
LYMPHOCYTES # BLD AUTO: 0.66 K/UL — LOW (ref 1–3.3)
LYMPHOCYTES # BLD AUTO: 26.2 % — SIGNIFICANT CHANGE UP (ref 13–44)
MACROCYTES BLD QL: SLIGHT — SIGNIFICANT CHANGE UP
MCHC RBC-ENTMCNC: 30.6 PG — SIGNIFICANT CHANGE UP (ref 27–34)
MCHC RBC-ENTMCNC: 34.3 GM/DL — SIGNIFICANT CHANGE UP (ref 32–36)
MCV RBC AUTO: 89.3 FL — SIGNIFICANT CHANGE UP (ref 80–100)
MONOCYTES # BLD AUTO: 0.21 K/UL — SIGNIFICANT CHANGE UP (ref 0–0.9)
MONOCYTES NFR BLD AUTO: 8.4 % — SIGNIFICANT CHANGE UP (ref 2–14)
NEUTROPHILS # BLD AUTO: 1.51 K/UL — LOW (ref 1.8–7.4)
NEUTROPHILS NFR BLD AUTO: 59.8 % — SIGNIFICANT CHANGE UP (ref 43–77)
OVALOCYTES BLD QL SMEAR: SLIGHT — SIGNIFICANT CHANGE UP
PCO2 BLDV: 33 MMHG — LOW (ref 39–52)
PH BLDV: 7.34 — SIGNIFICANT CHANGE UP (ref 7.32–7.43)
PLAT MORPH BLD: NORMAL — SIGNIFICANT CHANGE UP
PLATELET # BLD AUTO: 338 K/UL — SIGNIFICANT CHANGE UP (ref 150–400)
PLATELET COUNT - ESTIMATE: NORMAL — SIGNIFICANT CHANGE UP
PO2 BLDV: 72 MMHG — HIGH (ref 25–45)
POIKILOCYTOSIS BLD QL AUTO: SLIGHT — SIGNIFICANT CHANGE UP
POLYCHROMASIA BLD QL SMEAR: SLIGHT — SIGNIFICANT CHANGE UP
POTASSIUM BLDV-SCNC: 3.5 MMOL/L — SIGNIFICANT CHANGE UP (ref 3.5–5.1)
POTASSIUM SERPL-MCNC: 4.4 MMOL/L — SIGNIFICANT CHANGE UP (ref 3.5–5.3)
POTASSIUM SERPL-SCNC: 4.4 MMOL/L — SIGNIFICANT CHANGE UP (ref 3.5–5.3)
PROT SERPL-MCNC: 8.1 G/DL — SIGNIFICANT CHANGE UP (ref 6–8.3)
PROTHROM AB SERPL-ACNC: 11 SEC — SIGNIFICANT CHANGE UP (ref 9.5–13)
RBC # BLD: 4.02 M/UL — SIGNIFICANT CHANGE UP (ref 3.8–5.2)
RBC # FLD: 13.9 % — SIGNIFICANT CHANGE UP (ref 10.3–14.5)
RBC BLD AUTO: ABNORMAL
RH IG SCN BLD-IMP: POSITIVE — SIGNIFICANT CHANGE UP
SAO2 % BLDV: 95.8 % — HIGH (ref 67–88)
SMUDGE CELLS # BLD: PRESENT — SIGNIFICANT CHANGE UP
SODIUM SERPL-SCNC: 141 MMOL/L — SIGNIFICANT CHANGE UP (ref 135–145)
VARIANT LYMPHS # BLD: 2.8 % — SIGNIFICANT CHANGE UP (ref 0–6)
WBC # BLD: 2.53 K/UL — LOW (ref 3.8–10.5)
WBC # FLD AUTO: 2.53 K/UL — LOW (ref 3.8–10.5)

## 2024-08-09 PROCEDURE — 93010 ELECTROCARDIOGRAM REPORT: CPT

## 2024-08-09 PROCEDURE — 99285 EMERGENCY DEPT VISIT HI MDM: CPT

## 2024-08-09 RX ORDER — CEPHALEXIN 250 MG
1 CAPSULE ORAL
Qty: 10 | Refills: 0
Start: 2024-08-09 | End: 2024-08-13

## 2024-08-09 RX ORDER — SULFAMETHOXAZOLE AND TRIMETHOPRIM 400; 80 MG/1; MG/1
2 TABLET ORAL
Qty: 10 | Refills: 0
Start: 2024-08-09 | End: 2024-08-13

## 2024-08-09 RX ORDER — ACETAMINOPHEN 500 MG
1000 TABLET ORAL ONCE
Refills: 0 | Status: DISCONTINUED | OUTPATIENT
Start: 2024-08-09 | End: 2024-08-09

## 2024-08-09 RX ORDER — DEXTROSE MONOHYDRATE, SODIUM CHLORIDE, SODIUM LACTATE, CALCIUM CHLORIDE, MAGNESIUM CHLORIDE 1.5; 538; 448; 18.4; 5.08 G/100ML; MG/100ML; MG/100ML; MG/100ML; MG/100ML
1000 SOLUTION INTRAPERITONEAL ONCE
Refills: 0 | Status: COMPLETED | OUTPATIENT
Start: 2024-08-09 | End: 2024-08-09

## 2024-08-09 RX ADMIN — Medication 4 MILLIGRAM(S): at 22:06

## 2024-08-09 RX ADMIN — Medication 4 MILLIGRAM(S): at 23:09

## 2024-08-09 RX ADMIN — DEXTROSE MONOHYDRATE, SODIUM CHLORIDE, SODIUM LACTATE, CALCIUM CHLORIDE, MAGNESIUM CHLORIDE 1000 MILLILITER(S): 1.5; 538; 448; 18.4; 5.08 SOLUTION INTRAPERITONEAL at 23:09

## 2024-08-09 RX ADMIN — DEXTROSE MONOHYDRATE, SODIUM CHLORIDE, SODIUM LACTATE, CALCIUM CHLORIDE, MAGNESIUM CHLORIDE 1000 MILLILITER(S): 1.5; 538; 448; 18.4; 5.08 SOLUTION INTRAPERITONEAL at 22:09

## 2024-08-09 NOTE — ED ADULT NURSE NOTE - CHIEF COMPLAINT QUOTE
Patient post-op butt lift procedure on 7/1 in [place], has a picture of a large open wound on her upper buttocks, reports clear drainage from wound. Denies fevers. Denies phx

## 2024-08-09 NOTE — ED ADULT NURSE NOTE - SUICIDE SCREENING DEPRESSION
Negative Soothing eye drops such as Systane or Genteal would be reasonable.  The Pataday would be fine too.

## 2024-08-09 NOTE — ED PROVIDER NOTE - PATIENT PORTAL LINK FT
You can access the FollowMyHealth Patient Portal offered by [facility] by registering at the following website: [email or web address]. By joining [facility]’s FollowMyHealth portal, you will also be able to view your health information using other applications (apps) compatible with our system.

## 2024-08-09 NOTE — ED ADULT TRIAGE NOTE - BANDS:
[Menarche Age ____] : age at menarche was [unfilled] [Menopause Age____] : age at menopause was [unfilled] [Total Preg ___] : G[unfilled] [History of Hormone Replacement Treatment] : has no history of hormone replacement treatment [FreeTextEntry6] : no [FreeTextEntry7] : no [FreeTextEntry8] : no Allergy;

## 2024-08-09 NOTE — ED ADULT NURSE NOTE - OBJECTIVE STATEMENT
Patient presents to ED for post-op complication s/p butt lift procedure on 7/1 in [place]. She is A&Ox4, in no acute distress. States yesterday, she noted that wound opened up and serous drainage was coming out from it. She denies CP, dizziness, SOB, dyspnea, fevers, chills, urinary symptoms. Denies pain. No redness, no swelling. Pending MD/provider evaluation.

## 2024-08-09 NOTE — ED PROVIDER NOTE - ATTENDING CONTRIBUTION TO CARE
Brief HPI:  54-year-old female no significant past medical history status post cosmetic gluteal implant performed in [place] 7/1/2024 presents with open wound to her buttocks for 2 weeks.  Patient states she had surgical drains removed mov the approximately 1 week postop in [place].  Approximate 2 weeks ago she started to notice opening of the wound in the sacral and coccygeal area.  States that she had a visiting wound care nurse practitioner come to her home today and instructed to come to the ED for evaluation.  She reports some clear drainage on the packed gauze area covering the wound.  Denies bleeding, purulent discharge, foul smell, fever, chills, numbness, tingling, weakness.    Vitals:   Reviewed    Exam:    GEN:  Non-toxic appearing, non-distressed, speaking full sentences, non-diaphoretic, AAOx3  HEENT:  NCAT, neck supple, EOMI, PERRLA, sclera anicteric, no conjunctival pallor or injection, no stridor, normal voice, no tonsillar exudate, uvula midline  CV:  tachycardic, regular, s1/s2 audible, no murmurs, rubs or gallops, peripheral pulses 2+ and symmetric  PULM:  non-labored respirations, lungs clear to auscultation bilaterally, no wheezes, crackles or rales  ABD:  non distended, non-tender, no rebound, no guarding, negative Cano's sign, bowel sounds normal, no cvat  MSK:  no gross deformity, non-tender extremities and joints, range of motion grossly normal appearing, no extremity edema, extremities warm and well perfused   NEURO:  AAOx3, CN II-XII intact, motor 5/5 in upper and lower extremities bilaterally, sensation grossly intact in extremities and trunk, finger to nose testing wnl, no nystagmus, negative Romberg, no pronator drift, no gait deficit  SKIN:  ~5 cm vertical open area of wound dehiscence of superior gluteal cleft abudditing the coccygeal area, no active drainage, no malodor, no crepitus; visible area of superior aspect of implant     A/P:   54-year-old female no significant past medical history status post cosmetic gluteal implant performed in [place] 7/1/2024 presents with open wound to her buttocks for 2 weeks.  Tachycardic, afebrile.  Exam with gluteal cleft wound dehiscence.  Wound does not appear infected at this time.  No malodor, purulence, cellulitis.  Will send labs, possible ct, plastic surgery consult.  Disposition pending.

## 2024-08-09 NOTE — ED PROVIDER NOTE - NSFOLLOWUPINSTRUCTIONS_ED_ALL_ED_FT
What is wound dehiscence?  Wound dehiscence is when part or all of a wound comes apart. The wound may come apart if it does not heal completely, or it may heal and then open again. A surgical wound is an example of a wound can that develop dehiscence. Wound dehiscence can become life-threatening.    In your case, there is an exposed gluteal implant. Exposed implants can become a source of serious infection that can lead to sepsis, permanent organ damage, need for further procedures, coma and death. The definitive treatment of this surgical complication is immediate removal of the implant. You were offered surgical removal of exposed implant, however you declined this service.     You were explained the  risks of deferring immediate surgery including the risk of recurrent infection, progressive infection, sepsis, permanent damage to multiple organ systems, need for further hospitalization, need for further surgical procedure, coma, and death.     You were prescribed prophylactic antibiotics with the understanding that this is NOT a definitive treatment.   Please finish full course of antibiotics as prescribed and follow up with a plastic surgeon as soon as possible for immediate implant removal.     Please return to the ED immediately if you are experiencing:   -Increased drainage or bleeding from the wound that won’t stop with direct pressure  -Redness in or around the wound  -Foul odor or pus coming from the wound  -Increased swelling around the wound  -Fever above 100.4°F or shaking chills

## 2024-08-09 NOTE — ED PROVIDER NOTE - PHYSICAL EXAMINATION
Gen: NAD, non-toxic appearing  Head: normal appearing  HEENT: normal conjunctiva, oral mucosa moist  Lung: no respiratory distress, speaking in full sentences  Abd: soft, non distended, non tender, 6cm open wound, questionable dehiscence, at superior gluteal cleft with visible implant, no surrounding skin changes/erythema (Chaperoned by RN Dr Smith)  MSK: no visible deformities  Neuro: No focal deficits, AAOx3  Psych: normal affect

## 2024-08-09 NOTE — ED PROVIDER NOTE - PROGRESS NOTE DETAILS
Spoke with plastics Dr. Hassan who advised labs to r/o infection, no imaging warranted, and ultimately plan for removal of implant. Removal can be nonemergent if no signs of infection on labs. Will discuss these recommendations with patient and determine plan. Consulted plastics and spoke with Dr. Hassan who advised labs to r/o infection, no imaging warranted, and ultimately plan for removal of implant. Removal can be nonemergent if no signs of infection on labs. Will discuss these recommendations with patient and determine plan. Spoke with patient in depth about the risks and benefits of implant removal. She expressed understanding of the risk of inevitable infection with exposed implant however at this time she does not want to undergo removal. Will wait for labs to result and have another discussion with patient regarding results and treatment plan options. Spoke with Dr. Hassan regarding case and results. As the patient has no white count or fevers, he states that definitive treatment is removal of implant however surgical intervention is nonemergent. He states that he is able to remove the implant but unable to place a replacement. His final recommendations at this time include surgical removal with himself or another provider that can provide replacement implant and   discharge home. Spoke with the patient about her options at length. The patient does not consent to implant removal at this time and would rather seek surgical consult at a facility that can replace the implant if removal is still warranted. As the patient will be opting to seek care with a plastic surgeon that can replace her implant, Dr. Hassan has declined to see patient in person or document a formal consult at this time. Spoke with patient regarding her increased lactate and tachycardia potentially indicating infection. Explained again that the recommendations for definitive treatment is removal of implant, as this the exposed implant is a nidus for infection. Fully discussed risks and benefits of not removing implant including infection that can lead to life threatening sepsis. Patient expressed understanding and states that she is willing to stay for IV fluids, pain control, and antibiotics but does not consent to implant removal at this time. As plastic surgeon has declined to see patient, will consult general surgery for a physical exam from a surgical provider prior to patient leaving. CECILY:  Plastic surgeon on-call Dr. Hassan was consulted for gluteal cleft wound dehiscence with exposed gluteal implant.  Patient's surgical history, history of present illness, vital signs, lab results reviewed with Dr. Hassan.  Dr. Hassan recommended treatment of surgical removal of implant, however stated that he would not perform replacement of implant.  This was communicated to patient who stated she would not agree to this procedure due to desire to salvage current implant or have implant replaced.  I requested Dr. Hassan perform formal consult in ED or document these recommendations in EMR however, he declined as he stated that he would not be evaluating the patient in person if they were not to agree to surgery as recommended.  I explained to the patient the risks of deferring immediate surgery including the risk of recurrent infection, progressive infection, sepsis, permanent damage to multiple organ systems, need for further hospitalization, need for further surgical procedure, coma, and death.  The patient demonstrated an understanding of these risks and again declined surgical procedure as recommended by Dr. Hassan.  During this conversation, the patient was alert and oriented to person, place, time and situation.  She was not altered or intoxicated appearing.  With my impression the patient had full medical decision-making capacity to decline surgical procedure. CECILY:  Plastic surgeon on-call Dr. Hassan was consulted for gluteal cleft wound dehiscence with exposed gluteal implant.  Patient's surgical history, history of present illness, vital signs, lab results reviewed with Dr. Hassan.  Dr. Hassan recommended treatment of surgical removal of implant, however stated that he would not perform replacement of implant.  This was communicated to patient who stated she would not agree to this procedure due to desire to salvage current implant or have implant replaced.  I requested Dr. Hassan perform formal consult in ED or document these recommendations in EMR however, he declined as he stated that he would not be evaluating the patient in person if they were not to agree to surgery as recommended.  I explained to the patient the risks of deferring immediate surgery including the risk of recurrent infection, progressive infection, sepsis, permanent damage to multiple organ systems, need for further hospitalization, need for further surgical procedure, coma, and death.  The patient demonstrated an understanding of these risks and again declined surgical procedure as recommended by Dr. Hassan.  During this conversation, the patient was alert and oriented to person, place, time and situation.  She was not altered or intoxicated appearing.  It was my impression the patient had full medical decision-making capacity to decline surgical procedure. Spoke with patient regarding her increased lactate and tachycardia potentially indicating infection. Explained again that the recommendations for definitive treatment is removal of implant, as this the exposed implant is a nidus for infection. Fully discussed risks and benefits of not removing implant including infection that can lead to life threatening sepsis. Patient expressed understanding and states that she is willing to stay for IV fluids, pain control, and antibiotics but does not consent to implant removal at this time. As plastic surgeon has declined to see patient, will discuss with general surgery whether or not they can offer recommendations and physical exam for this patient. I informed this patient of the need for further medical evaluation and care given the patient's current medical condition.   This patient declined further medical evaluation and treatment.  I informed this patient of the benefits of further medical evaluation and care at this facility.  I informed this patient of the risks of leaving against our medical advice without properly completing our evaluation today that include illness, injury, permenant disability and even death.  The patient was also informed about alternatives.  I informed the patient of the possible necessity for hospital admission depending on future findings.   At the time of discussion this patient maintained full faculties of judgement and medical decision making capacity.    In accordance with this patient's wishes the patient was discharged from the emergency department against our medical advice in stable condition with normal vital signs in no acute distress. CECILY:  Plastic surgeon on-call Dr. Hassan was consulted for gluteal cleft wound dehiscence with exposed gluteal implant.  Via telephone, I personally discussed patient's surgical history, history of present illness, vital signs, lab results which were reviewed with Dr. Hassan.  Dr. Hassan recommended treatment of surgical removal of implant, however stated that he would not perform replacement of implant during this procedure.  This was communicated to patient who stated she would not agree to this procedure due to desire to salvage current implant or have implant replaced.  I requested Dr. Hassan perform formal consult in ED or document these recommendations in EMR however, he declined as he stated that he would not be evaluating the patient in person if they were not to agree to surgery as recommended.  I explained to the patient the risks of deferring immediate surgery including the risk of current or new infection, progressive infection, sepsis, permanent damage to multiple organ systems, need for further hospitalization, need for further surgical procedure, permanent disability or disfigurement, coma, and death.  The patient demonstrated an understanding of these risks and again declined surgical procedure as recommended by Dr. Hassan.  During this conversation, the patient was alert and oriented to person, place, time and situation.  She was not altered or intoxicated appearing.  It was my impression the patient had full medical decision-making capacity to decline surgical procedure.  She is pending reassessment after fluids.  Per discussion, the patient plan to leave against medical advice.  Agrees to sign AMA paperwork. CECILY:  Plastic surgeon on-call Dr. Hassan was consulted for gluteal cleft wound dehiscence with exposed gluteal implant.  Via telephone, I personally discussed patient's surgical history, history of present illness, vital signs, lab results which were reviewed with Dr. Hassan.  Dr. Hassan recommended treatment of surgical removal of implant, however stated that he would not perform replacement of implant during this procedure.  This was communicated to patient who stated she would not agree to this procedure due to desire to salvage current implant or have implant replaced.  I requested Dr. Hassna perform formal consult in ED or document these recommendations in EMR however, he declined as he stated that he would not be evaluating the patient in person if they were not to agree to surgery as recommended.  I explained to the patient the risks of deferring immediate surgery including the risk of possible current or new infection, progressive infection, sepsis, permanent damage to multiple organ systems, need for further hospitalization, need for further surgical procedure, permanent disability or disfigurement, coma, and death.  The patient demonstrated an understanding of these risks and again declined surgical procedure as recommended by Dr. Hassan.  During this conversation, the patient was alert and oriented to person, place, time and situation.  She was not altered or intoxicated appearing.  It was my impression the patient had full medical decision-making capacity to decline surgical procedure.  She is pending reassessment after fluids.  Per discussion, the patient plan to leave against medical advice.  Will prescribe antibiotics for infection prophylaxis.  Agrees to sign AMA paperwork.

## 2024-08-09 NOTE — ED ADULT NURSE NOTE - NSFALLUNIVINTERV_ED_ALL_ED
Bed/Stretcher in lowest position, wheels locked, appropriate side rails in place/Call bell, personal items and telephone in reach/Instruct patient to call for assistance before getting out of bed/chair/stretcher/Non-slip footwear applied when patient is off stretcher/[place] to call system/Physically safe environment - no spills, clutter or unnecessary equipment/Purposeful proactive rounding/Room/bathroom lighting operational, light cord in reach

## 2024-08-09 NOTE — ED PROVIDER NOTE - CLINICAL SUMMARY MEDICAL DECISION MAKING FREE TEXT BOX
Afebrile hemodynamically stable 54-year-old female with no past medical history complaining of 3 weeks of open wound s/p butt implant procedure in [place] 7/1. Wound developed 2 weeks post op and has gradually became larger, noted clear fluid draining for the past week.  Patient states that she had a follow-up with a postop NP at home today who packed the wound and advised her to come to the ED. Not taking anything for pain. No fevers, chills, chest pain, SOB, N/V/D, dysuria, purulent discharge or bleeding from surgical site. Exam significant for 6cm open wound, questionable dehiscence, at superior gluteal cleft with visible implant. No clinical signs of infection and no systemic symptoms. Plan for CBC, CMP, post op labs. Will discuss case with plastic surgery and determine best course of action. Afebrile hemodynamically stable 54-year-old female with no past medical history complaining of 3 weeks of open wound s/p buttock implant procedure in [place] 7/1. Wound developed 2 weeks post op and has gradually became larger, noted clear fluid draining for the past week.  Patient states that she had a follow-up with a postop NP at home today who packed the wound and advised her to come to the ED. Not taking anything for pain. No fevers, chills, chest pain, SOB, N/V/D, dysuria, purulent discharge or bleeding from surgical site. Exam significant for 6cm open wound, questionable dehiscence, at superior gluteal cleft with visible implant. No clinical signs of infection and no systemic symptoms. Plan for CBC, CMP, post op labs. Will discuss case with plastic surgery and determine best course of action.

## 2024-08-09 NOTE — ED ADULT TRIAGE NOTE - CHIEF COMPLAINT QUOTE
Patient post-op butt lift procedure on 7/1 in [place], has a picture of a large open wound on her upper buttocks, reports clear drainage from wound. Denies fevers. Denies phx

## 2024-08-09 NOTE — ED PROVIDER NOTE - IN ACCORDANCE WITH [place] STATE LAW, WE OFFER EVERY PATIENT A HEPATITIS C TEST. WOULD YOU LIKE TO BE TESTED TODAY?
"Spoke with CHRISTINE Parker. Nurse states for two days, patient has been more lethargic and has not been eating. She states patient has had \"some\" to drink. She states patient is normally alert and confused, but has been more drowsy for two days. Nurse denies fever, nausea, vomiting, diarrhea.      Patt Marin RN  07/05/20 0956    " Opt out

## 2024-08-09 NOTE — ED PROVIDER NOTE - OBJECTIVE STATEMENT
54-year-old female with no past medical history complaining of 3 weeks of open wound s/p butt implant procedure in [place] 7/1. Drains were removed 1 week post op in [place]. Pt noticed open wound 2 weeks post op. Wound has gradually became larger, noted clear fluid draining for the past week.  Patient states that she had a follow-up with a postop NP at home today who packed the wound and advised her to come to the ED. Not taking anything for pain. No fevers, chills, chest pain, SOB, N/V/D, dysuria, purulent discharge or bleeding from surgical site. 54-year-old female with no past medical history complaining of 3 weeks of open wound s/p buttock implant procedure in [place] 7/1. Drains were removed 1 week post op in [place]. Pt noticed open wound 2 weeks post op. Wound has gradually became larger, noted clear fluid draining for the past week.  Patient states that she had a follow-up with a postop NP at home today who packed the wound and advised her to come to the ED. Not taking anything for pain. No fevers, chills, chest pain, SOB, N/V/D, dysuria, purulent discharge or bleeding from surgical site.

## 2024-08-10 LAB
BASE EXCESS BLDV CALC-SCNC: -2.6 MMOL/L — LOW (ref -2–3)
BLOOD GAS VENOUS COMPREHENSIVE RESULT: SIGNIFICANT CHANGE UP
CHLORIDE BLDV-SCNC: 105 MMOL/L — SIGNIFICANT CHANGE UP (ref 96–108)
CO2 BLDV-SCNC: 27.9 MMOL/L — HIGH (ref 22–26)
GAS PNL BLDV: 138 MMOL/L — SIGNIFICANT CHANGE UP (ref 136–145)
GLUCOSE BLDV-MCNC: 96 MG/DL — SIGNIFICANT CHANGE UP (ref 70–99)
HCO3 BLDV-SCNC: 26 MMOL/L — SIGNIFICANT CHANGE UP (ref 22–29)
HCT VFR BLDA CALC: 37 % — SIGNIFICANT CHANGE UP (ref 34.5–46.5)
HGB BLD CALC-MCNC: 12.4 G/DL — SIGNIFICANT CHANGE UP (ref 11.7–16.1)
LACTATE BLDV-MCNC: 2.6 MMOL/L — HIGH (ref 0.5–2)
PCO2 BLDV: 62 MMHG — HIGH (ref 39–52)
PH BLDV: 7.23 — LOW (ref 7.32–7.43)
PO2 BLDV: <20 MMHG — LOW (ref 25–45)
POTASSIUM BLDV-SCNC: 4.2 MMOL/L — SIGNIFICANT CHANGE UP (ref 3.5–5.1)
SAO2 % BLDV: 23 % — LOW (ref 67–88)

## 2024-08-10 NOTE — ED POST DISCHARGE NOTE - REASON FOR FOLLOW-UP
Other I called patient via telephone as prior to AMA last night patient was requesting short course of opiate pain medication, which had not been prescribed.  Confirmed with patient that she would still like this prescription for breakthrough and severe pain.  Overall, patient states she feels well.  No new or worsening symptoms since AMA last night.

## 2024-11-15 ENCOUNTER — APPOINTMENT (OUTPATIENT)
Dept: DERMATOLOGY | Facility: CLINIC | Age: 54
End: 2024-11-15
Payer: MEDICAID

## 2024-11-15 VITALS — HEIGHT: 69 IN | BODY MASS INDEX: 28.14 KG/M2 | WEIGHT: 190 LBS

## 2024-11-15 DIAGNOSIS — S01.319A LACERATION W/OUT FOREIGN BODY OF UNSPECIFIED EAR, INITIAL ENCOUNTER: ICD-10-CM

## 2024-11-15 DIAGNOSIS — L65.9 NONSCARRING HAIR LOSS, UNSPECIFIED: ICD-10-CM

## 2024-11-15 DIAGNOSIS — L29.9 PRURITUS, UNSPECIFIED: ICD-10-CM

## 2024-11-15 DIAGNOSIS — D23.9 OTHER BENIGN NEOPLASM OF SKIN, UNSPECIFIED: ICD-10-CM

## 2024-11-15 PROCEDURE — 11900 INJECT SKIN LESIONS </W 7: CPT

## 2024-11-15 PROCEDURE — 99204 OFFICE O/P NEW MOD 45 MIN: CPT | Mod: 25

## 2024-12-11 ENCOUNTER — APPOINTMENT (OUTPATIENT)
Dept: PLASTIC SURGERY | Facility: CLINIC | Age: 54
End: 2024-12-11
Payer: MEDICAID

## 2024-12-11 DIAGNOSIS — H61.119 ACQUIRED DEFORMITY OF PINNA, UNSPECIFIED EAR: ICD-10-CM

## 2024-12-11 PROCEDURE — 99203 OFFICE O/P NEW LOW 30 MIN: CPT

## 2024-12-13 ENCOUNTER — NON-APPOINTMENT (OUTPATIENT)
Age: 54
End: 2024-12-13

## 2025-01-13 ENCOUNTER — APPOINTMENT (OUTPATIENT)
Dept: DERMATOLOGY | Facility: CLINIC | Age: 55
End: 2025-01-13
Payer: MEDICAID

## 2025-01-13 VITALS — HEIGHT: 69 IN | BODY MASS INDEX: 29.62 KG/M2 | WEIGHT: 200 LBS

## 2025-01-13 DIAGNOSIS — L65.8 OTHER SPECIFIED NONSCARRING HAIR LOSS: ICD-10-CM

## 2025-01-13 DIAGNOSIS — L65.9 NONSCARRING HAIR LOSS, UNSPECIFIED: ICD-10-CM

## 2025-01-13 DIAGNOSIS — L66.9 CICATRICIAL ALOPECIA, UNSPECIFIED: ICD-10-CM

## 2025-01-13 PROCEDURE — 11901 INJECT SKIN LESIONS >7: CPT

## 2025-01-13 PROCEDURE — 99214 OFFICE O/P EST MOD 30 MIN: CPT | Mod: 25

## 2025-01-28 ENCOUNTER — APPOINTMENT (OUTPATIENT)
Dept: PLASTIC SURGERY | Facility: CLINIC | Age: 55
End: 2025-01-28

## 2025-02-13 ENCOUNTER — APPOINTMENT (OUTPATIENT)
Dept: DERMATOLOGY | Facility: CLINIC | Age: 55
End: 2025-02-13
Payer: MEDICAID

## 2025-02-13 DIAGNOSIS — L65.9 NONSCARRING HAIR LOSS, UNSPECIFIED: ICD-10-CM

## 2025-02-13 DIAGNOSIS — L29.9 PRURITUS, UNSPECIFIED: ICD-10-CM

## 2025-02-13 DIAGNOSIS — L66.9 CICATRICIAL ALOPECIA, UNSPECIFIED: ICD-10-CM

## 2025-02-13 PROCEDURE — 11900 INJECT SKIN LESIONS </W 7: CPT

## 2025-02-13 PROCEDURE — 99213 OFFICE O/P EST LOW 20 MIN: CPT | Mod: 25

## 2025-03-04 ENCOUNTER — APPOINTMENT (OUTPATIENT)
Dept: PLASTIC SURGERY | Facility: CLINIC | Age: 55
End: 2025-03-04

## 2025-03-13 ENCOUNTER — APPOINTMENT (OUTPATIENT)
Dept: PLASTIC SURGERY | Facility: CLINIC | Age: 55
End: 2025-03-13
Payer: MEDICAID

## 2025-03-13 PROCEDURE — 14060 TIS TRNFR E/N/E/L 10 SQ CM/<: CPT

## 2025-03-27 ENCOUNTER — APPOINTMENT (OUTPATIENT)
Dept: DERMATOLOGY | Facility: CLINIC | Age: 55
End: 2025-03-27

## 2025-05-07 ENCOUNTER — APPOINTMENT (OUTPATIENT)
Dept: DERMATOLOGY | Facility: CLINIC | Age: 55
End: 2025-05-07

## 2025-05-07 DIAGNOSIS — L66.9 CICATRICIAL ALOPECIA, UNSPECIFIED: ICD-10-CM

## 2025-05-07 DIAGNOSIS — L29.9 PRURITUS, UNSPECIFIED: ICD-10-CM

## 2025-05-07 DIAGNOSIS — M32.9 SYSTEMIC LUPUS ERYTHEMATOSUS, UNSPECIFIED: ICD-10-CM

## 2025-05-07 PROCEDURE — 11900 INJECT SKIN LESIONS </W 7: CPT

## 2025-05-07 PROCEDURE — 99214 OFFICE O/P EST MOD 30 MIN: CPT | Mod: 25

## 2025-09-03 ENCOUNTER — APPOINTMENT (OUTPATIENT)
Dept: RHEUMATOLOGY | Facility: CLINIC | Age: 55
End: 2025-09-03

## 2025-09-03 VITALS
WEIGHT: 224 LBS | HEIGHT: 69 IN | SYSTOLIC BLOOD PRESSURE: 104 MMHG | RESPIRATION RATE: 16 BRPM | OXYGEN SATURATION: 99 % | BODY MASS INDEX: 33.18 KG/M2 | HEART RATE: 80 BPM | DIASTOLIC BLOOD PRESSURE: 72 MMHG | TEMPERATURE: 96.9 F